# Patient Record
Sex: FEMALE | Race: WHITE | Employment: UNEMPLOYED | URBAN - METROPOLITAN AREA
[De-identification: names, ages, dates, MRNs, and addresses within clinical notes are randomized per-mention and may not be internally consistent; named-entity substitution may affect disease eponyms.]

---

## 2018-11-27 ENCOUNTER — ANESTHESIA (OUTPATIENT)
Dept: ENDOSCOPY | Age: 28
End: 2018-11-27
Payer: MEDICAID

## 2018-11-27 ENCOUNTER — ANESTHESIA EVENT (OUTPATIENT)
Dept: ENDOSCOPY | Age: 28
End: 2018-11-27
Payer: MEDICAID

## 2018-11-27 ENCOUNTER — HOSPITAL ENCOUNTER (OUTPATIENT)
Age: 28
Setting detail: OUTPATIENT SURGERY
Discharge: HOME | End: 2018-11-27
Attending: SPECIALIST | Admitting: SPECIALIST
Payer: MEDICAID

## 2018-11-27 VITALS
HEART RATE: 84 BPM | OXYGEN SATURATION: 100 % | SYSTOLIC BLOOD PRESSURE: 109 MMHG | TEMPERATURE: 97.6 F | BODY MASS INDEX: 21.76 KG/M2 | WEIGHT: 152 LBS | DIASTOLIC BLOOD PRESSURE: 61 MMHG | HEIGHT: 70 IN | RESPIRATION RATE: 16 BRPM

## 2018-11-27 LAB
H PYLORI FROM TISSUE: NEGATIVE
HCG UR QL: NEGATIVE
KIT LOT NO., HCLOLOT: NORMAL
NEGATIVE CONTROL: NEGATIVE
POSITIVE CONTROL: POSITIVE

## 2018-11-27 PROCEDURE — 77030009426 HC FCPS BIOP ENDOSC BSC -B: Performed by: SPECIALIST

## 2018-11-27 PROCEDURE — 87077 CULTURE AEROBIC IDENTIFY: CPT | Performed by: SPECIALIST

## 2018-11-27 PROCEDURE — 76060000032 HC ANESTHESIA 0.5 TO 1 HR: Performed by: SPECIALIST

## 2018-11-27 PROCEDURE — 74011250636 HC RX REV CODE- 250/636

## 2018-11-27 PROCEDURE — 76040000007: Performed by: SPECIALIST

## 2018-11-27 PROCEDURE — 77030027957 HC TBNG IRR ENDOGTR BUSS -B: Performed by: SPECIALIST

## 2018-11-27 PROCEDURE — 88305 TISSUE EXAM BY PATHOLOGIST: CPT

## 2018-11-27 PROCEDURE — 81025 URINE PREGNANCY TEST: CPT

## 2018-11-27 RX ORDER — NALOXONE HYDROCHLORIDE 0.4 MG/ML
0.4 INJECTION, SOLUTION INTRAMUSCULAR; INTRAVENOUS; SUBCUTANEOUS
Status: DISCONTINUED | OUTPATIENT
Start: 2018-11-27 | End: 2018-11-27 | Stop reason: HOSPADM

## 2018-11-27 RX ORDER — SODIUM CHLORIDE 9 MG/ML
INJECTION, SOLUTION INTRAVENOUS
Status: DISCONTINUED | OUTPATIENT
Start: 2018-11-27 | End: 2018-11-27 | Stop reason: HOSPADM

## 2018-11-27 RX ORDER — SODIUM CHLORIDE 9 MG/ML
50 INJECTION, SOLUTION INTRAVENOUS CONTINUOUS
Status: DISCONTINUED | OUTPATIENT
Start: 2018-11-27 | End: 2018-11-27 | Stop reason: HOSPADM

## 2018-11-27 RX ORDER — CLONAZEPAM 0.5 MG/1
0.5 TABLET ORAL
COMMUNITY

## 2018-11-27 RX ORDER — FENTANYL CITRATE 50 UG/ML
200 INJECTION, SOLUTION INTRAMUSCULAR; INTRAVENOUS
Status: DISCONTINUED | OUTPATIENT
Start: 2018-11-27 | End: 2018-11-27 | Stop reason: HOSPADM

## 2018-11-27 RX ORDER — EPINEPHRINE 0.1 MG/ML
1 INJECTION INTRACARDIAC; INTRAVENOUS
Status: DISCONTINUED | OUTPATIENT
Start: 2018-11-27 | End: 2018-11-27 | Stop reason: HOSPADM

## 2018-11-27 RX ORDER — ALPRAZOLAM 0.5 MG/1
0.5 TABLET ORAL DAILY
COMMUNITY

## 2018-11-27 RX ORDER — DEXTROMETHORPHAN/PSEUDOEPHED 2.5-7.5/.8
1.2 DROPS ORAL
Status: DISCONTINUED | OUTPATIENT
Start: 2018-11-27 | End: 2018-11-27 | Stop reason: HOSPADM

## 2018-11-27 RX ORDER — PROPOFOL 10 MG/ML
INJECTION, EMULSION INTRAVENOUS AS NEEDED
Status: DISCONTINUED | OUTPATIENT
Start: 2018-11-27 | End: 2018-11-27 | Stop reason: HOSPADM

## 2018-11-27 RX ORDER — FLUMAZENIL 0.1 MG/ML
0.2 INJECTION INTRAVENOUS
Status: DISCONTINUED | OUTPATIENT
Start: 2018-11-27 | End: 2018-11-27 | Stop reason: HOSPADM

## 2018-11-27 RX ORDER — DEXTROAMPHETAMINE SACCHARATE, AMPHETAMINE ASPARTATE, DEXTROAMPHETAMINE SULFATE AND AMPHETAMINE SULFATE 2.5; 2.5; 2.5; 2.5 MG/1; MG/1; MG/1; MG/1
10 TABLET ORAL 2 TIMES DAILY
COMMUNITY

## 2018-11-27 RX ORDER — ATROPINE SULFATE 0.1 MG/ML
0.5 INJECTION INTRAVENOUS
Status: DISCONTINUED | OUTPATIENT
Start: 2018-11-27 | End: 2018-11-27 | Stop reason: HOSPADM

## 2018-11-27 RX ORDER — LIDOCAINE HYDROCHLORIDE 20 MG/ML
INJECTION, SOLUTION EPIDURAL; INFILTRATION; INTRACAUDAL; PERINEURAL AS NEEDED
Status: DISCONTINUED | OUTPATIENT
Start: 2018-11-27 | End: 2018-11-27 | Stop reason: HOSPADM

## 2018-11-27 RX ORDER — SODIUM CHLORIDE 0.9 % (FLUSH) 0.9 %
5-10 SYRINGE (ML) INJECTION AS NEEDED
Status: DISCONTINUED | OUTPATIENT
Start: 2018-11-27 | End: 2018-11-27 | Stop reason: HOSPADM

## 2018-11-27 RX ORDER — MIDAZOLAM HYDROCHLORIDE 1 MG/ML
.25-1 INJECTION, SOLUTION INTRAMUSCULAR; INTRAVENOUS
Status: DISCONTINUED | OUTPATIENT
Start: 2018-11-27 | End: 2018-11-27 | Stop reason: HOSPADM

## 2018-11-27 RX ORDER — IBUPROFEN 200 MG
600 CAPSULE ORAL 2 TIMES DAILY
COMMUNITY
End: 2019-12-05

## 2018-11-27 RX ORDER — SODIUM CHLORIDE 0.9 % (FLUSH) 0.9 %
5-10 SYRINGE (ML) INJECTION EVERY 8 HOURS
Status: DISCONTINUED | OUTPATIENT
Start: 2018-11-27 | End: 2018-11-27 | Stop reason: HOSPADM

## 2018-11-27 RX ORDER — ACETAMINOPHEN 500 MG
500 TABLET ORAL 2 TIMES DAILY
COMMUNITY

## 2018-11-27 RX ADMIN — PROPOFOL 40 MG: 10 INJECTION, EMULSION INTRAVENOUS at 10:23

## 2018-11-27 RX ADMIN — SODIUM CHLORIDE: 9 INJECTION, SOLUTION INTRAVENOUS at 10:18

## 2018-11-27 RX ADMIN — PROPOFOL 40 MG: 10 INJECTION, EMULSION INTRAVENOUS at 10:26

## 2018-11-27 RX ADMIN — PROPOFOL 60 MG: 10 INJECTION, EMULSION INTRAVENOUS at 10:30

## 2018-11-27 RX ADMIN — PROPOFOL 60 MG: 10 INJECTION, EMULSION INTRAVENOUS at 10:27

## 2018-11-27 RX ADMIN — LIDOCAINE HYDROCHLORIDE 40 MG: 20 INJECTION, SOLUTION EPIDURAL; INFILTRATION; INTRACAUDAL; PERINEURAL at 10:20

## 2018-11-27 RX ADMIN — PROPOFOL 30 MG: 10 INJECTION, EMULSION INTRAVENOUS at 10:40

## 2018-11-27 RX ADMIN — PROPOFOL 50 MG: 10 INJECTION, EMULSION INTRAVENOUS at 10:35

## 2018-11-27 RX ADMIN — PROPOFOL 90 MG: 10 INJECTION, EMULSION INTRAVENOUS at 10:20

## 2018-11-27 RX ADMIN — PROPOFOL 50 MG: 10 INJECTION, EMULSION INTRAVENOUS at 10:22

## 2018-11-27 RX ADMIN — PROPOFOL 60 MG: 10 INJECTION, EMULSION INTRAVENOUS at 10:21

## 2018-11-27 RX ADMIN — PROPOFOL 40 MG: 10 INJECTION, EMULSION INTRAVENOUS at 10:41

## 2018-11-27 RX ADMIN — PROPOFOL 30 MG: 10 INJECTION, EMULSION INTRAVENOUS at 10:38

## 2018-11-27 NOTE — PROGRESS NOTES
Cheyanne Stubbssantos 1990 
[id number] Situation: 
Verbal report received from: [facility] Territory Procedure: Procedure(s): 
COLONOSCOPY 
ESOPHAGOGASTRODUODENOSCOPY (EGD) ESOPHAGOGASTRODUODENAL (EGD) BIOPSY Background: 
 
Preoperative diagnosis: Abdominal pain Postoperative diagnosis: 1. Mild Gastritis 2. Ileitis :  Dr. Lucila Mckeon Assistant(s): Endoscopy RN-1: Alexandra Munson RN Endoscopy RN-2: Mirta Gregg RN Specimens:  
ID Type Source Tests Collected by Time Destination 1 : Terminal Ileum Biopsy for Ileitis Preservative   Rosa Suaoz MD 11/27/2018 1036 Pathology H. Pylori  yes Assessment: 
Intra-procedure medications Anesthesia gave intra-procedure sedation and medications, see anesthesia flow sheet yes Intravenous fluids: NS@ Garett Neighbours Vital signs stable  yes Abdominal assessment: round and soft  yes Recommendation: 
Discharge patient per MD order yes. Family or Friend  yes Permission to share finding with family or friend yes

## 2018-11-27 NOTE — PROGRESS NOTES

## 2018-11-27 NOTE — DISCHARGE INSTRUCTIONS
Kristi August  [id number]  1990    COLON DISCHARGE INSTRUCTIONS  Discomfort:  Redness at IV site- apply warm compress to area; if redness or soreness persist- contact your physician  There may be a slight amount of blood passed from the rectum  Gaseous discomfort- walking, belching will help relieve any discomfort  You may not operate a vehicle for 12 hours  You may not engage in an occupation involving machinery or appliances for rest of today  You may not drink alcoholic beverages for at least 12 hours  Avoid making any critical decisions for at least 24 hour  DIET:   {Gi dietary recommendations:62798}   - however -  remember your colon is empty and a heavy meal will produce gas. Avoid these foods:  vegetables, fried / greasy foods, carbonated drinks for today. MEDICATIONS:      Regarding Aspirin or Nonsteroidal medications, please see below. ACTIVITY:  You may resume your normal daily activities it is recommended that you spend the remainder of the day resting -  avoid any strenuous activity. CALL M.D. ANY SIGN OF:  Increasing pain, nausea, vomiting  Abdominal distension (swelling)  New increased bleeding (oral or rectal)  Fever (chills)  Pain in chest area  Bloody discharge from nose or mouth  Shortness of breath    You {Actions; may/not:34710}  take any Advil, Aspirin, Ibuprofen, Motrin, Aleve, or Goodys for 10 days, ONLY  Tylenol as needed for pain. Follow-up Instructions:   Call Dr. Delicia Osorio  Results of procedure / biopsy in 10 days  Telephone #  [phone]      DISCHARGE SUMMARY from Nurse    The following personal items collected during your admission are returned to you:   Dental Appliance: Dental Appliances: None  Vision: Visual Aid: Glasses, Contacts(glasses only today)  Hearing Aid:    Jewelry:    Clothing:    Other Valuables:    Valuables sent to safe: MyChart Activation    Thank you for requesting access to [facility].  Please follow the instructions below to securely access and download your online medical record. [employer] allows you to send messages to your doctor, view your test results, renew your prescriptions, schedule appointments, and more. How Do I Sign Up? 1. In your internet browser, go to [email or web address]  2. Click on the First Time User? Click Here link in the Sign In box. You will be redirect to the New Member Sign Up page. 3. Enter your [employer] Access Code exactly as it appears below. You will not need to use this code after youve completed the sign-up process. If you do not sign up before the expiration date, you must request a new code. [employer] Access Code: -[email or web address]  Expires: 2019  9:11 AM (This is the date your [employer] access code will )    4. Enter the last four digits of your Social Security Number (xxxx) and Date of Birth (mm/dd/yyyy) as indicated and click Submit. You will be taken to the next sign-up page. 5. Create a [employer] ID. This will be your [employer] login ID and cannot be changed, so think of one that is secure and easy to remember. 6. Create a [employer] password. You can change your password at any time. 7. Enter your Password Reset Question and Answer. This can be used at a later time if you forget your password. 8. Enter your e-mail address. You will receive e-mail notification when new information is available in [address] 9. Click Sign Up. You can now view and download portions of your medical record. 10. Click the Download Summary menu link to download a portable copy of your medical information. Additional Information    If you have questions, please visit the Frequently Asked Questions section of the [employer] website at [email or web address] Remember, [employer] is NOT to be used for urgent needs. For medical emergencies, dial 911.

## 2018-11-27 NOTE — PROCEDURES
[address]  Navdeep Garza, [address]                 NAME:  Clari Mcneil   :   1990   MRN:   [id number]     Date/Time:  2018 10:47 AM    Esophagogastroduodenoscopy (EGD) Procedure Note    :  Shannan Sue MD    Referring Provider:  Corinne Troy, Not On File    Anethesia/Sedation:  MAC anesthesia Propofol    Preoperative diagnosis: Abdominal pain    Postoperative diagnosis: 1. Gastritis  2. Ileitis    Procedure Details     After infom consent was obtained for the procedure, with all risks and benefits of procedure explained the patient was taken to the endoscopy suite and placed in the left lateral decubitus position. Following sequential administration of sedation as per above, the [id number] gastroscope was inserted into the mouth and advanced under direct vision to second portion of the duodenum. A careful inspection was made as the gastroscope was withdrawn, including a retroflexed view of the proximal stomach; findings and interventions are described below. Findings:  Esophagus:normal  Stomach:Patchy erythema in antrum, JUAN DAVID done. Duodenum/jejunum:normal      Therapies:  none    Specimens: JUAN DAVID test           EBL: None    Complications:   None; patient tolerated the procedure well. Impression:    See Postoperative diagnosis above    Recommendations:  -Acid suppression with a proton pump inhibitor. , -Await JUAN DAVID test result and treat for Helicobacter pylori if positive. , -No NSAIDS    Discharge disposition:  Home in the company of  when able to ambulate    Shannan Sue MD

## 2018-11-27 NOTE — ANESTHESIA POSTPROCEDURE EVALUATION
Procedure(s): 
COLONOSCOPY 
ESOPHAGOGASTRODUODENOSCOPY (EGD) ESOPHAGOGASTRODUODENAL (EGD) BIOPSY COLON BIOPSY. <BSHSIANPOST> Visit Vitals /61 Pulse 84 Temp 36.4 °C (97.6 °F) Resp 16 Ht 5' 10\" (1.778 m) Wt 68.9 kg (152 lb) SpO2 100% BMI 21.81 kg/m²

## 2018-11-27 NOTE — PROCEDURES
[address]  [address]                 Colonoscopy Procedure Note    Indications:   See Preoperative Diagnosis above  Referring Physician: Kelvin Skinner, Not On File  Anesthesia/Sedation: MAC anesthesia Propofol  Endoscopist:  Dr. Franck Grey  Assistant:  Endoscopy RN-1: Eugenie Allison RN  Endoscopy RN-2: Guera Gordillo RN  Preoperative diagnosis: Abdominal pain  Postoperative diagnosis: 1. Mild Gastritis  2. Ileitis    Procedure in Detail:  Informed consent was obtained for the procedure, including sedation. Risks of perforation, hemorrhage, adverse drug reaction, and aspiration were discussed. The patient was placed in the left lateral decubitus position. Based on the pre-procedure assessment, including review of the patient's medical history, medications, allergies, and review of systems, she had been deemed to be an appropriate candidate for moderate sedation; she was therefore sedated with the medications listed above. The patient was monitored continuously with ECG tracing, pulse oximetry, blood pressure monitoring, and direct observations. A rectal examination was performed. The [id number] was inserted into the rectum and advanced under direct vision to the terminal ileum. The quality of the colonic preparation was adequate. A careful inspection was made as the colonoscope was withdrawn, including a retroflexed view of the rectum; findings and interventions are described below. Appropriate photodocumentation was obtained. Findings:  Rectum: normal  Sigmoid: normal  Descending Colon: normal  Transverse Colon: normal  Ascending Colon: normal  Cecum: normal  Terminal Ileum: Patchy erythema and congestion noted, biopsies done. Specimens:    ileal biopsy    EBL: None    Complications: None; patient tolerated the procedure well. Recommendations:     - Await pathology. - Colon at Age 49. Office appointment in 3 months.         Signed By: Jo Rojo MD                        November 27, 2018

## 2019-05-21 LAB
ANTIBODY SCREEN, EXTERNAL: NORMAL
CHLAMYDIA, EXTERNAL: NORMAL
HIV, EXTERNAL: NORMAL
N. GONORRHEA, EXTERNAL: NORMAL
RPR, EXTERNAL: NON REACTIVE
RUBELLA, EXTERNAL: NORMAL
TYPE, ABO & RH, EXTERNAL: NORMAL

## 2019-05-28 LAB — HBSAG, EXTERNAL: NEGATIVE

## 2019-11-19 LAB — GRBS, EXTERNAL: NORMAL

## 2019-12-03 ENCOUNTER — HOSPITAL ENCOUNTER (INPATIENT)
Age: 29
LOS: 2 days | Discharge: HOME | DRG: 560 | End: 2019-12-05
Attending: SPECIALIST | Admitting: SPECIALIST
Payer: MEDICAID

## 2019-12-03 ENCOUNTER — ANESTHESIA (OUTPATIENT)
Dept: LABOR AND DELIVERY | Age: 29
DRG: 560 | End: 2019-12-03
Payer: MEDICAID

## 2019-12-03 ENCOUNTER — ANESTHESIA EVENT (OUTPATIENT)
Dept: LABOR AND DELIVERY | Age: 29
DRG: 560 | End: 2019-12-03
Payer: MEDICAID

## 2019-12-03 PROBLEM — Z34.90 PREGNANCY: Status: ACTIVE | Noted: 2019-12-03

## 2019-12-03 LAB
BASOPHILS # BLD: 0 K/UL (ref 0–0.1)
BASOPHILS NFR BLD: 0 % (ref 0–1)
DIFFERENTIAL METHOD BLD: ABNORMAL
EOSINOPHIL # BLD: 0.1 K/UL (ref 0–0.4)
EOSINOPHIL NFR BLD: 1 % (ref 0–7)
ERYTHROCYTE [DISTWIDTH] IN BLOOD BY AUTOMATED COUNT: 13.2 % (ref 11.5–14.5)
HCT VFR BLD AUTO: 36.2 % (ref 35–47)
HGB BLD-MCNC: 11.7 G/DL (ref 11.5–16)
IMM GRANULOCYTES # BLD AUTO: 0.1 K/UL (ref 0–0.04)
IMM GRANULOCYTES NFR BLD AUTO: 1 % (ref 0–0.5)
LYMPHOCYTES # BLD: 1.9 K/UL (ref 0.8–3.5)
LYMPHOCYTES NFR BLD: 18 % (ref 12–49)
MCH RBC QN AUTO: 27.7 PG (ref 26–34)
MCHC RBC AUTO-ENTMCNC: 32.3 G/DL (ref 30–36.5)
MCV RBC AUTO: 85.8 FL (ref 80–99)
MONOCYTES # BLD: 0.9 K/UL (ref 0–1)
MONOCYTES NFR BLD: 9 % (ref 5–13)
NEUTS SEG # BLD: 7.5 K/UL (ref 1.8–8)
NEUTS SEG NFR BLD: 71 % (ref 32–75)
NRBC # BLD: 0 K/UL (ref 0–0.01)
NRBC BLD-RTO: 0 PER 100 WBC
PLATELET # BLD AUTO: 257 K/UL (ref 150–400)
PMV BLD AUTO: 11.7 FL (ref 8.9–12.9)
RBC # BLD AUTO: 4.22 M/UL (ref 3.8–5.2)
WBC # BLD AUTO: 10.4 K/UL (ref 3.6–11)

## 2019-12-03 PROCEDURE — 36415 COLL VENOUS BLD VENIPUNCTURE: CPT

## 2019-12-03 PROCEDURE — 77030014125 HC TY EPDRL BBMI -B: Performed by: ANESTHESIOLOGY

## 2019-12-03 PROCEDURE — 85025 COMPLETE CBC W/AUTO DIFF WBC: CPT

## 2019-12-03 PROCEDURE — 74011250637 HC RX REV CODE- 250/637: Performed by: SPECIALIST

## 2019-12-03 PROCEDURE — 3E0R3BZ INTRODUCTION OF ANESTHETIC AGENT INTO SPINAL CANAL, PERCUTANEOUS APPROACH: ICD-10-PCS | Performed by: ANESTHESIOLOGY

## 2019-12-03 PROCEDURE — 77030005513 HC CATH URETH FOL11 MDII -B

## 2019-12-03 PROCEDURE — 00HU33Z INSERTION OF INFUSION DEVICE INTO SPINAL CANAL, PERCUTANEOUS APPROACH: ICD-10-PCS | Performed by: ANESTHESIOLOGY

## 2019-12-03 PROCEDURE — 74011000250 HC RX REV CODE- 250: Performed by: ANESTHESIOLOGY

## 2019-12-03 PROCEDURE — 75410000000 HC DELIVERY VAGINAL/SINGLE

## 2019-12-03 PROCEDURE — 76060000078 HC EPIDURAL ANESTHESIA

## 2019-12-03 PROCEDURE — 75410000003 HC RECOV DEL/VAG/CSECN EA 0.5 HR

## 2019-12-03 PROCEDURE — 75410000002 HC LABOR FEE PER 1 HR

## 2019-12-03 PROCEDURE — A4300 CATH IMPL VASC ACCESS PORTAL: HCPCS

## 2019-12-03 PROCEDURE — 77030021125

## 2019-12-03 PROCEDURE — 74011250636 HC RX REV CODE- 250/636: Performed by: SPECIALIST

## 2019-12-03 PROCEDURE — 65410000002 HC RM PRIVATE OB

## 2019-12-03 RX ORDER — CALCIUM CARBONATE 200(500)MG
400 TABLET,CHEWABLE ORAL
Status: DISCONTINUED | OUTPATIENT
Start: 2019-12-03 | End: 2019-12-05 | Stop reason: HOSPADM

## 2019-12-03 RX ORDER — FENTANYL/BUPIVACAINE/NS/PF 2-1250MCG
PREFILLED PUMP RESERVOIR EPIDURAL
Status: DISCONTINUED
Start: 2019-12-03 | End: 2019-12-03 | Stop reason: HOSPADM

## 2019-12-03 RX ORDER — SODIUM CHLORIDE 0.9 % (FLUSH) 0.9 %
5-40 SYRINGE (ML) INJECTION EVERY 8 HOURS
Status: DISCONTINUED | OUTPATIENT
Start: 2019-12-03 | End: 2019-12-05 | Stop reason: HOSPADM

## 2019-12-03 RX ORDER — NALOXONE HYDROCHLORIDE 0.4 MG/ML
0.4 INJECTION, SOLUTION INTRAMUSCULAR; INTRAVENOUS; SUBCUTANEOUS AS NEEDED
Status: DISCONTINUED | OUTPATIENT
Start: 2019-12-03 | End: 2019-12-03 | Stop reason: HOSPADM

## 2019-12-03 RX ORDER — SODIUM CHLORIDE 0.9 % (FLUSH) 0.9 %
5-40 SYRINGE (ML) INJECTION EVERY 8 HOURS
Status: DISCONTINUED | OUTPATIENT
Start: 2019-12-03 | End: 2019-12-03 | Stop reason: HOSPADM

## 2019-12-03 RX ORDER — OXYTOCIN/0.9 % SODIUM CHLORIDE 20/1000 ML
1 PLASTIC BAG, INJECTION (ML) INTRAVENOUS
Status: DISCONTINUED | OUTPATIENT
Start: 2019-12-03 | End: 2019-12-04 | Stop reason: HOSPADM

## 2019-12-03 RX ORDER — OXYTOCIN IN 5 % DEXTROSE 30/500 ML
1 PLASTIC BAG, INJECTION (ML) INTRAVENOUS
Status: DISCONTINUED | OUTPATIENT
Start: 2019-12-03 | End: 2019-12-03

## 2019-12-03 RX ORDER — NALOXONE HYDROCHLORIDE 0.4 MG/ML
0.4 INJECTION, SOLUTION INTRAMUSCULAR; INTRAVENOUS; SUBCUTANEOUS AS NEEDED
Status: DISCONTINUED | OUTPATIENT
Start: 2019-12-03 | End: 2019-12-05 | Stop reason: HOSPADM

## 2019-12-03 RX ORDER — BUPIVACAINE HYDROCHLORIDE AND EPINEPHRINE 2.5; 5 MG/ML; UG/ML
INJECTION, SOLUTION EPIDURAL; INFILTRATION; INTRACAUDAL; PERINEURAL
Status: DISCONTINUED
Start: 2019-12-03 | End: 2019-12-03 | Stop reason: HOSPADM

## 2019-12-03 RX ORDER — FENTANYL/BUPIVACAINE/NS/PF 2-1250MCG
1-16 PREFILLED PUMP RESERVOIR EPIDURAL CONTINUOUS
Status: DISCONTINUED | OUTPATIENT
Start: 2019-12-03 | End: 2019-12-03 | Stop reason: HOSPADM

## 2019-12-03 RX ORDER — OXYCODONE AND ACETAMINOPHEN 5; 325 MG/1; MG/1
1 TABLET ORAL
Status: DISCONTINUED | OUTPATIENT
Start: 2019-12-03 | End: 2019-12-05 | Stop reason: HOSPADM

## 2019-12-03 RX ORDER — ONDANSETRON 2 MG/ML
4 INJECTION INTRAMUSCULAR; INTRAVENOUS
Status: DISCONTINUED | OUTPATIENT
Start: 2019-12-03 | End: 2019-12-03 | Stop reason: HOSPADM

## 2019-12-03 RX ORDER — BUPIVACAINE HYDROCHLORIDE AND EPINEPHRINE 2.5; 5 MG/ML; UG/ML
INJECTION, SOLUTION EPIDURAL; INFILTRATION; INTRACAUDAL; PERINEURAL AS NEEDED
Status: DISCONTINUED | OUTPATIENT
Start: 2019-12-03 | End: 2019-12-03 | Stop reason: HOSPADM

## 2019-12-03 RX ORDER — ZOLPIDEM TARTRATE 5 MG/1
5 TABLET ORAL
Status: DISCONTINUED | OUTPATIENT
Start: 2019-12-03 | End: 2019-12-05 | Stop reason: HOSPADM

## 2019-12-03 RX ORDER — FENTANYL CITRATE 50 UG/ML
100 INJECTION, SOLUTION INTRAMUSCULAR; INTRAVENOUS ONCE
Status: ACTIVE | OUTPATIENT
Start: 2019-12-03 | End: 2019-12-04

## 2019-12-03 RX ORDER — NALBUPHINE HYDROCHLORIDE 10 MG/ML
10 INJECTION, SOLUTION INTRAMUSCULAR; INTRAVENOUS; SUBCUTANEOUS
Status: DISCONTINUED | OUTPATIENT
Start: 2019-12-03 | End: 2019-12-03 | Stop reason: HOSPADM

## 2019-12-03 RX ORDER — SODIUM CHLORIDE 0.9 % (FLUSH) 0.9 %
5-40 SYRINGE (ML) INJECTION AS NEEDED
Status: DISCONTINUED | OUTPATIENT
Start: 2019-12-03 | End: 2019-12-03 | Stop reason: HOSPADM

## 2019-12-03 RX ORDER — SODIUM CHLORIDE, SODIUM LACTATE, POTASSIUM CHLORIDE, CALCIUM CHLORIDE 600; 310; 30; 20 MG/100ML; MG/100ML; MG/100ML; MG/100ML
125 INJECTION, SOLUTION INTRAVENOUS CONTINUOUS
Status: DISCONTINUED | OUTPATIENT
Start: 2019-12-03 | End: 2019-12-03 | Stop reason: HOSPADM

## 2019-12-03 RX ORDER — IBUPROFEN 400 MG/1
800 TABLET ORAL EVERY 8 HOURS
Status: DISCONTINUED | OUTPATIENT
Start: 2019-12-03 | End: 2019-12-05 | Stop reason: HOSPADM

## 2019-12-03 RX ORDER — EPHEDRINE SULFATE/0.9% NACL/PF 50 MG/5 ML
10 SYRINGE (ML) INTRAVENOUS
Status: DISCONTINUED | OUTPATIENT
Start: 2019-12-03 | End: 2019-12-03 | Stop reason: HOSPADM

## 2019-12-03 RX ORDER — SODIUM CHLORIDE 0.9 % (FLUSH) 0.9 %
5-40 SYRINGE (ML) INJECTION AS NEEDED
Status: DISCONTINUED | OUTPATIENT
Start: 2019-12-03 | End: 2019-12-05 | Stop reason: HOSPADM

## 2019-12-03 RX ORDER — HYDROCORTISONE ACETATE PRAMOXINE HCL 2.5; 1 G/100G; G/100G
CREAM TOPICAL AS NEEDED
Status: DISCONTINUED | OUTPATIENT
Start: 2019-12-03 | End: 2019-12-05 | Stop reason: HOSPADM

## 2019-12-03 RX ORDER — BUPIVACAINE HYDROCHLORIDE AND EPINEPHRINE 2.5; 5 MG/ML; UG/ML
INJECTION, SOLUTION EPIDURAL; INFILTRATION; INTRACAUDAL; PERINEURAL
Status: COMPLETED
Start: 2019-12-03 | End: 2019-12-03

## 2019-12-03 RX ORDER — CALCIUM CARBONATE 200(500)MG
2 TABLET,CHEWABLE ORAL 3 TIMES DAILY
COMMUNITY

## 2019-12-03 RX ADMIN — IBUPROFEN 800 MG: 400 TABLET, FILM COATED ORAL at 22:00

## 2019-12-03 RX ADMIN — CALCIUM CARBONATE (ANTACID) CHEW TAB 500 MG 400 MG: 500 CHEW TAB at 19:27

## 2019-12-03 RX ADMIN — Medication 333 MILLI-UNITS/MIN: at 20:38

## 2019-12-03 RX ADMIN — SODIUM CHLORIDE, SODIUM LACTATE, POTASSIUM CHLORIDE, AND CALCIUM CHLORIDE 125 ML/HR: 600; 310; 30; 20 INJECTION, SOLUTION INTRAVENOUS at 11:02

## 2019-12-03 RX ADMIN — BUPIVACAINE HYDROCHLORIDE AND EPINEPHRINE 5 ML: 2.5; 5 INJECTION, SOLUTION EPIDURAL; INFILTRATION; INTRACAUDAL; PERINEURAL at 14:11

## 2019-12-03 RX ADMIN — SODIUM CHLORIDE, SODIUM LACTATE, POTASSIUM CHLORIDE, AND CALCIUM CHLORIDE 999 ML/HR: 600; 310; 30; 20 INJECTION, SOLUTION INTRAVENOUS at 13:45

## 2019-12-03 RX ADMIN — SODIUM CHLORIDE, SODIUM LACTATE, POTASSIUM CHLORIDE, AND CALCIUM CHLORIDE 125 ML/HR: 600; 310; 30; 20 INJECTION, SOLUTION INTRAVENOUS at 14:19

## 2019-12-03 RX ADMIN — BUPIVACAINE HYDROCHLORIDE AND EPINEPHRINE 5 ML: 2.5; 5 INJECTION, SOLUTION EPIDURAL; INFILTRATION; INTRACAUDAL; PERINEURAL at 14:06

## 2019-12-03 RX ADMIN — BUPIVACAINE HYDROCHLORIDE AND EPINEPHRINE 0.5 ML: 2.5; 5 INJECTION, SOLUTION EPIDURAL; INFILTRATION; INTRACAUDAL; PERINEURAL at 14:05

## 2019-12-03 RX ADMIN — Medication 12 ML/HR: at 14:45

## 2019-12-03 NOTE — ROUTINE PROCESS
1040 pt from office for labor started uc about 0900, no bleeding pos fm, consents signed, pt intobed efm x 2

## 2019-12-03 NOTE — H&P
History & Physical    Name: Shae Mayer MRN: [id number]  SSN: [id number]    YOB: 1990  Age: 30 y.o. Sex: female        Subjective:     Estimated Date of Delivery: None noted. OB History    Para Term  AB Living   1             SAB TAB Ectopic Molar Multiple Live Births                    # Outcome Date GA Lbr [place]/2nd Weight Sex Delivery Anes PTL Lv   1 Current                Ms. Red Bianchi is admitted with pregnancy at Unknown for active labor. Prenatal course was normal. Please see prenatal records for details. Past Medical History:   Diagnosis Date    Anxiety      Past Surgical History:   Procedure Laterality Date    COLONOSCOPY N/A 2018    COLONOSCOPY performed by Ivonne Garcia MD at [facility] Not on file   Tobacco Use    Smoking status: Current Some Day Smoker    Smokeless tobacco: Never Used    Tobacco comment: Socially smokes   Substance and Sexual Activity    Alcohol use: Yes     Comment: very rare    Drug use: No    Sexual activity: Not on file     No family history on file. No Known Allergies  Prior to Admission medications    Medication Sig Start Date End Date Taking? Authorizing Provider   ibuprofen 200 mg cap Take 600 mg by mouth two (2) times a day. Provider, Historical   acetaminophen (TYLENOL EXTRA STRENGTH) 500 mg tablet Take 500 mg by mouth two (2) times a day. Provider, Historical   dextroamphetamine-amphetamine (ADDERALL) 10 mg tablet Take 10 mg by mouth two (2) times a day. Provider, Historical   clonazePAM (KLONOPIN) 0.5 mg tablet Take 0.5 mg by mouth nightly as needed. Provider, Historical   ALPRAZolam (XANAX) 0.5 mg tablet Take 0.5 mg by mouth daily. Provider, Historical   dextroamphetamine-amphetamine (ADDERALL) 10 mg tablet Take 10 mg by mouth two (2) times a day. Provider, Historical   ALPRAZolam (XANAX) 0.5 mg tablet Take 0.5 mg by mouth daily as needed for Anxiety. Provider, Historical   clonazePAM (KLONOPIN) 0.5 mg tablet Take 0.5 mg by mouth two (2) times daily as needed. Provider, Historical   acetaminophen (TYLENOL EXTRA STRENGTH) 500 mg tablet Take 1,000 mg by mouth two (2) times daily as needed for Pain. Provider, Historical   ibuprofen (MOTRIN) 200 mg tablet Take 600 mg by mouth two (2) times daily as needed for Pain. Provider, Historical        Review of Systems: A comprehensive review of systems was negative except for that written in the HPI. Objective:     Vitals: There were no vitals filed for this visit. Physical Exam:  5cm  Membranes:  Intact  Fetal Heart Rate: Reactive    Prenatal Labs:   No results found for: ABORH, RUBELLAEXT, GRBSEXT, HBSAGEXT, HIVEXT, RPREXT, GONNOEXT, CHLAMEXT, ABORHEXT, RUBELLAEXT, GRBSEXT, HBSAGEXT, HIVEXT, RPREXT, GONNOEXT, CHLAMEXT      Assessment/Plan:     Active Problems:    Pregnancy (12/3/2019)         Plan: Admit for Continue plan for vaginal delivery.   Group B Strep was see prenatal.

## 2019-12-03 NOTE — PROGRESS NOTES
1410 Bedside SBAR report received from JOVANNY Perkins RN  on  Milad Simon, in room  4666  ,care assumed. Pt sitting up getting an epidural by Dr Abena Wilson. 1413 Lying down S/P Epidural    1430  [place] Catheter inserted, clear yellow urine return.,  Pt turned to her left side. @ [address] Dr Dez Black and Charge Nurse H. Raelene Meckel in with my patient SVE and AROM by Dr Dez Black, 7 cm and clear fluid. 1830 feeling increased pain/pressure at pubic bone. . Left side lying release. 1843 Right side-lying release    1855 left side lying release    1910 Left side, trendelenburg position    1925 SVE by JAIRON Taylor RN 9 cm, Chux changed. 1930 Verbal bedside report given to JAIRON Taylor, [address] [place] [address], room 9502. Report consisted of patients Situation, Background, Assessment and Recommendations(SBAR). Information from the following report(s) SBAR, Kardex, Intake/Output, MAR and Recent Results were reviewed with the receiving nurse. Opportunity for questions and clarification was provided,care assumed.

## 2019-12-03 NOTE — ANESTHESIA PROCEDURE NOTES
CSE Block    Performed by: Dell Del Rio MD  Authorized by: Dell Del Rio MD     Pre-Procedure      OB Combined Spinal-Epidural Note    Risk and benefits were discussed with the patient and plans are to proceed. Patient was placed in the seated position. The back was prepped at the lumbar region with Duraprep. 3 ml 0.25% bupivacaine with 1:200K epinephrine  was used as local at L3 - L4. A 17 Tuohy needle was passed x 1 attempt(s) at the above stated level. Loss of resistance at 5 cm. A 25 g pencil point spinal needle was placed through the Touhy until CSF was obtained. 0.5 mL 0.25% bupivacaine with 1:200K epinephrine was deposited into the CSF. A 19 g spring type epidural catheter was placed through the Touhy and secured at 11 cm at the skin. Test dose with 5 mL 0.25% bupivacaine with 1:200 epinephrine was negative. No paresthesia.     Francisca Solorio MD  12/3/2019

## 2019-12-04 PROCEDURE — 74011250637 HC RX REV CODE- 250/637: Performed by: SPECIALIST

## 2019-12-04 PROCEDURE — 65410000002 HC RM PRIVATE OB

## 2019-12-04 RX ADMIN — CALCIUM CARBONATE (ANTACID) CHEW TAB 500 MG 400 MG: 500 CHEW TAB at 08:19

## 2019-12-04 RX ADMIN — CALCIUM CARBONATE (ANTACID) CHEW TAB 500 MG 400 MG: 500 CHEW TAB at 16:24

## 2019-12-04 RX ADMIN — IBUPROFEN 800 MG: 400 TABLET, FILM COATED ORAL at 05:12

## 2019-12-04 RX ADMIN — OXYCODONE AND ACETAMINOPHEN 1 TABLET: 5; 325 TABLET ORAL at 12:20

## 2019-12-04 RX ADMIN — OXYCODONE AND ACETAMINOPHEN 1 TABLET: 5; 325 TABLET ORAL at 08:19

## 2019-12-04 RX ADMIN — OXYCODONE AND ACETAMINOPHEN 1 TABLET: 5; 325 TABLET ORAL at 16:24

## 2019-12-04 RX ADMIN — IBUPROFEN 800 MG: 400 TABLET, FILM COATED ORAL at 12:21

## 2019-12-04 RX ADMIN — CALCIUM CARBONATE (ANTACID) CHEW TAB 500 MG 400 MG: 500 CHEW TAB at 12:20

## 2019-12-04 RX ADMIN — IBUPROFEN 800 MG: 400 TABLET, FILM COATED ORAL at 20:55

## 2019-12-04 RX ADMIN — OXYCODONE AND ACETAMINOPHEN 1 TABLET: 5; 325 TABLET ORAL at 20:55

## 2019-12-04 NOTE — ROUTINE PROCESS
Bedside shift change report given to AL Lucas (oncoming nurse) by JAIRON Nova RN (offgoing nurse). Report included the following information SBAR, Procedure Summary, Intake/Output, MAR and Recent Results.

## 2019-12-04 NOTE — PROGRESS NOTES
Post-Partum Day Number 1 Progress Note    Jose Radha     Information for the patient's :  Josette Leyden [[id number]]   Vaginal, Spontaneous   Patient doing well without significant complaint. Voiding without difficulty, normal lochia. Vitals:    Visit Vitals  /59 (BP 1 Location: Right arm, BP Patient Position: At rest)   Pulse 71   Temp 97.8 °F (36.6 °C)   Resp 18   Ht 5' 9\" (1.753 m)   Wt 86.2 kg (190 lb)   SpO2 97%   Breastfeeding Yes   BMI 28.06 kg/m²     Temp (24hrs), Av.4 °F (36.9 °C), Min:97.8 °F (36.6 °C), Max:98.8 °F (37.1 °C)          Exam:  Patient without distress. Abdomen soft, fundus firm, nontender                Perineum with normal lochia noted. Lower extremities are negative for swelling, cords or tenderness. Labs:     Lab Results   Component Value Date/Time    WBC 10.4 2019 11:00 AM    WBC 11.0 2009 12:15 PM    HGB 11.7 2019 11:00 AM    HGB 12.7 2009 12:15 PM    HCT 36.2 2019 11:00 AM    HCT 37.6 2009 12:15 PM    PLATELET 685  11:00 AM    PLATELET 109  12:15 PM       Recent Results (from the past 24 hour(s))   CBC WITH AUTOMATED DIFF    Collection Time: 19 11:00 AM   Result Value Ref Range    WBC 10.4 3.6 - 11.0 K/uL    RBC 4.22 3.80 - 5.20 M/uL    HGB 11.7 11.5 - 16.0 g/dL    HCT 36.2 35.0 - 47.0 %    MCV 85.8 80.0 - 99.0 FL    MCH 27.7 26.0 - 34.0 PG    MCHC 32.3 30.0 - 36.5 g/dL    RDW 13.2 11.5 - 14.5 %    PLATELET 441 997 - 530 K/uL    MPV 11.7 8.9 - 12.9 FL    NRBC 0.0 0  WBC    ABSOLUTE NRBC 0.00 0.00 - 0.01 K/uL    NEUTROPHILS 71 32 - 75 %    LYMPHOCYTES 18 12 - 49 %    MONOCYTES 9 5 - 13 %    EOSINOPHILS 1 0 - 7 %    BASOPHILS 0 0 - 1 %    IMMATURE GRANULOCYTES 1 (H) 0.0 - 0.5 %    ABS. NEUTROPHILS 7.5 1.8 - 8.0 K/UL    ABS. LYMPHOCYTES 1.9 0.8 - 3.5 K/UL    ABS. MONOCYTES 0.9 0.0 - 1.0 K/UL    ABS. EOSINOPHILS 0.1 0.0 - 0.4 K/UL    ABS.  BASOPHILS 0.0 0.0 - 0.1 K/UL    ABS. IMM. GRANS. 0.1 (H) 0.00 - 0.04 K/UL    DF AUTOMATED         Assessment: Doing well, post partum day 1      Plan:  1.  Continue routine postpartum and perineal care as well as maternal education

## 2019-12-04 NOTE — LACTATION NOTE
This note was copied from a baby's chart. P3  1st time breastfeeding mother. Pt will successfully establish breastfeeding by feeding in response to early feeding cues or wake every 3h, will obtain deep latch, and will keep log of feedings/output. Taught to BF at hunger cues and or q 2-3 hrs and to offer 10-20 drops of hand expressed colostrum at any non-feeds. Has independently hand expressed drops every hour with last 3 attempts     Breast Assessment  Left Breast: Medium  Left Nipple: Everted, Intact  Right Breast: Medium  Right Nipple: Intact, Everted  Breast- Feeding Assessment  Attends Breast-Feeding Classes: No  Breast-Feeding Experience: No  Breast Trauma/Surgery: No  Type/Quality: Good  Lactation Consultant Visits  Breast-Feedings: Good     Reviewed breastfeeding basics:  How milk is made and normal  breastfeeding behaviors discussed. Supply and demand,  stomach size, early feeding cues, skin to skin bonding, positioning and baby led latch-on, assymetrical latch with signs of good, deep latch vs shallow, feeding frequency and duration, and log sheet for tracking infant feedings and output. Breastfeeding Booklet and Warm line information given. Discussed typical  weight loss and the importance of infant weight checks with pediatrician 1 day post discharge. Recommend calling insurance company for breast pump benefit for prn uses. Will continue to follow and observe. Skin to skin and resting. 1100 30 minute feeding completed. Doing well.

## 2019-12-04 NOTE — PROGRESS NOTES
TRANSFER - IN REPORT:    Verbal report received from [address] [place] [address], RN(name) on Nando Salas  being received from L&D(unit) for routine progression of care      Report consisted of patients Situation, Background, Assessment and   Recommendations(SBAR). Information from the following report(s) SBAR, Procedure Summary, Intake/Output, MAR and Recent Results was reviewed with the receiving nurse. Opportunity for questions and clarification was provided. Assessment completed upon patients arrival to unit and care assumed.

## 2019-12-04 NOTE — PROGRESS NOTES
Assumed care. Intro. Done. poc explained. Assess. Done feeling lots of pressure. sve done 9 cm. pericare done. Underpad changed.    1950. Dr. Maurisio Jacome at bedside. View strip poc explained. sve done. 9 c m    2200. Motrin given after eating crackers and dinner served.    2330. Bedside report given to miu nurse and care turned over.

## 2019-12-04 NOTE — PROCEDURES
of a viable male infant over an intact perineum in OA position. +nuchal cord reduced on the perineum. No shoulder dystocia. Placenta del spont, intact with 3VC. NO LACERATIONS. EBL 200ml.

## 2019-12-05 VITALS
WEIGHT: 190 LBS | RESPIRATION RATE: 16 BRPM | TEMPERATURE: 98.1 F | HEART RATE: 86 BPM | OXYGEN SATURATION: 97 % | HEIGHT: 69 IN | SYSTOLIC BLOOD PRESSURE: 116 MMHG | BODY MASS INDEX: 28.14 KG/M2 | DIASTOLIC BLOOD PRESSURE: 78 MMHG

## 2019-12-05 PROCEDURE — 77030021125

## 2019-12-05 PROCEDURE — 90715 TDAP VACCINE 7 YRS/> IM: CPT | Performed by: SPECIALIST

## 2019-12-05 PROCEDURE — 74011250637 HC RX REV CODE- 250/637: Performed by: SPECIALIST

## 2019-12-05 PROCEDURE — 90471 IMMUNIZATION ADMIN: CPT

## 2019-12-05 PROCEDURE — 90686 IIV4 VACC NO PRSV 0.5 ML IM: CPT | Performed by: SPECIALIST

## 2019-12-05 PROCEDURE — 74011250636 HC RX REV CODE- 250/636: Performed by: SPECIALIST

## 2019-12-05 RX ORDER — IBUPROFEN 800 MG/1
800 TABLET ORAL
Qty: 20 TAB | Refills: 0 | Status: SHIPPED | OUTPATIENT
Start: 2019-12-05

## 2019-12-05 RX ADMIN — OXYCODONE AND ACETAMINOPHEN 1 TABLET: 5; 325 TABLET ORAL at 10:35

## 2019-12-05 RX ADMIN — INFLUENZA VIRUS VACCINE 0.5 ML: 15; 15; 15; 15 SUSPENSION INTRAMUSCULAR at 09:30

## 2019-12-05 RX ADMIN — TETANUS TOXOID, REDUCED DIPHTHERIA TOXOID AND ACELLULAR PERTUSSIS VACCINE, ADSORBED 0.5 ML: 5; 2.5; 8; 8; 2.5 SUSPENSION INTRAMUSCULAR at 09:30

## 2019-12-05 RX ADMIN — IBUPROFEN 800 MG: 400 TABLET, FILM COATED ORAL at 04:46

## 2019-12-05 RX ADMIN — CALCIUM CARBONATE (ANTACID) CHEW TAB 500 MG 400 MG: 500 CHEW TAB at 08:25

## 2019-12-05 NOTE — ROUTINE PROCESS
Bedside and Verbal shift change report given to AL Casey (oncoming nurse) by Jason Lopez RN (offgoing nurse). Report included the following information SBAR, Procedure Summary, Intake/Output and MAR.

## 2019-12-05 NOTE — DISCHARGE INSTRUCTIONS
Vaginal Childbirth: What To Expect At Home    Your Recovery: Your body will slowly heal in the next few weeks. It is easy to get too tired and overwhelmed during the first weeks after your baby is born. Changes in your hormones can shift your mood without warning. You may find it hard to meet the extra demands on your energy and time. Take it easy on yourself. Follow-up care is a key part of your treatment and safety. Be sure to make and go to all appointments, and call your doctor if you are having problems. It's also a good idea to know your test results and keep a list of the medicines you take. How can you care for yourself at home? Vaginal bleeding and cramps  · After delivery, you will have a bloody discharge from the vagina. This will turn pink within a week and then white or yellow after about 10 days. It may last for 2 to 4 weeks or longer, until the uterus has healed. Use pads instead of tampons until you stop bleeding. · Do not worry if you pass some blood clots, as long as they are smaller than a golf ball. If you have a tear or stitches in your vaginal area, change the pad at least every 4 hours to prevent soreness and infection. · You may have cramps for the first few days after childbirth. These are normal and occur as the uterus shrinks to normal size. Take an over-the-counter pain medicine, such as acetaminophen (Tylenol), ibuprofen (Advil, Motrin), or naproxen (Aleve), for cramps. Read and follow all instructions on the label. Do not take aspirin, because it can cause more bleeding. Do not take acetaminophen (Tylenol) and other acetaminophen containing medications (i.e. Percocet) at the same time. Breast fullness  · Your breasts may overfill (engorge) in the first few days after delivery. To help milk flow and to relieve pain, warm your breasts in the shower or by using warm, moist towels before nursing.   · If you are not nursing, do not put warmth on your breasts or touch your breasts. Wear a tight bra or sports bra and use ice until the fullness goes away. This usually takes 2 to 3 days. · Put ice or a cold pack on your breast after nursing to reduce swelling and pain. Put a thin cloth between the ice and your skin. Activity  · Eat a balanced diet. Do not try to lose weight by cutting calories. Keep taking your prenatal vitamins, or take a multivitamin. · Get as much rest as you can. Try to take naps when your baby sleeps during the day. · Get some exercise every day. But do not do any heavy exercise until your doctor says it is okay. · Wait until you are healed (about 4 to 6 weeks) before you have sexual intercourse. Your doctor will tell you when it is okay to have sex. · Talk to your doctor about birth control. You can get pregnant even before your period returns. Also, you can get pregnant while you are breast-feeding. Mental Health  · Many women get the \"baby blues\" during the first few days after childbirth. You may lose sleep, feel irritable, and cry easily. You may feel happy one minute and sad the next. Hormone changes are one cause of these emotional changes. Also, the demands of a new baby, along with visits from relatives or other family needs, add to a mother's stress. The \"baby blues\" often peak around the fourth day. Then they ease up in less than 2 weeks. · If your moodiness or anxiety lasts for more than 2 weeks, or if you feel like life is not worth living, you may have postpartum depression. This is different for each mother. Some mothers with serious depression may worry intensely about their infant's well-being. Others may feel distant from their child. Some mothers might even feel that they might harm their baby. A mother may have signs of paranoia, wondering if someone is watching her. · With all the changes in your life, you may not know if you are depressed.  Pregnancy sometimes causes changes in how you feel that are similar to the symptoms of depression. · Symptoms of depression include:  · Feeling sad or hopeless and losing interest in daily activities. These are the most common symptoms of depression. · Sleeping too much or not enough. · Feeling tired. You may feel as if you have no energy. · Eating too much or too little. · [employer] ([employer]I) offers a Warm line; Chat with the Expert phone sessions; Information and Articles about Pregnancy and Postpartum Mood Disorders; Comprehensive List of Free Support Groups; Knowledgeable local coordinators who will offer support, information, and resources; Guide to Resources on [employer]; Calendar of events in the  mood disorders community; Latest News and Research; and [facility] [address] for [employer]. Remember - You are not alone; You are not to blame; With help, you will be well. 4-876-099-PPD(9209). WWW. POSTPARTUM. NET   · Writing or talking about death, such as writing suicide notes or talking about guns, knives, or pills. Keep the numbers for these national suicide hotlines: 5-648-969-TALK (6-791.614.9472) and 2-860-UNYAAYL (5-173.947.3877). If you or someone you know talks about suicide or feeling hopeless, get help right away. Constipation and Hemorrhoids  · Drink plenty of fluids, enough so that your urine is light yellow or clear like water. If you have kidney, heart, or liver disease and have to limit fluids, talk with your doctor before you increase the amount of fluids you drink. · Eat plenty of fiber each day. Have a bran muffin or bran cereal for breakfast, and try eating a piece of fruit for a mid-afternoon snack. · For painful, itchy hemorrhoids, put ice or a cold pack on the area several times a day for 10 minutes at a time. Follow this by putting a warm compress on the area for another 10 to 20 minutes or by sitting in a shallow, warm bath. When should you call for help? Call 911 anytime you think you may need emergency care.  For example, call if:  · You are thinking of hurting yourself, your baby, or anyone else. · You passed out (lost consciousness). · You have symptoms of a blood clot in your lung (called a pulmonary embolism). These may include:    · Sudden chest pain. · Trouble breathing. · Coughing up blood. Call your doctor now or seek immediate medical care if:  · You have severe vaginal bleeding. · You are soaking through a pad each hour for 2 or more hours. · Your vaginal bleeding seems to be getting heavier or is still bright red 4 days after delivery. · You are dizzy or lightheaded, or you feel like you may faint. · You are vomiting or cannot keep fluids down. · You have a fever. · You have new or more belly pain. · You pass tissue (not just blood). · Your vaginal discharge smells bad. · Your belly feels tender or full and hard. · Your breasts are continuously painful or red. · You feel sad, anxious, or hopeless for more than a few days. · You have sudden, severe pain in your belly. · You have symptoms of a blood clot in your leg (called a deep vein thrombosis),          such as:  · Pain in your calf, back of the knee, thigh, or groin. · Redness and swelling in your leg or groin. · You have symptoms of preeclampsia, such as:  · Sudden swelling of your face, hands, or feet. · New vision problems (such as dimness or blurring). · A severe headache. · Your blood pressure is higher than it should be or rises suddenly. · You have new nausea or vomiting. Watch closely for changes in your health, and be sure to contact your doctor if you have any problems.

## 2019-12-05 NOTE — LACTATION NOTE
This note was copied from a baby's chart. PP day #2  9 baby led feedings  4 wet and 5 stools. Am wt assessed at -5.16%      Breast Assessment  Left Breast: Medium  Left Nipple: Everted, Intact  Right Breast: Medium  Right Nipple: Intact, Everted  Breast- Feeding Assessment  Attends Breast-Feeding Classes: No  Breast-Feeding Experience: No  Breast Trauma/Surgery: No  Type/Quality: Good  Lactation Consultant Visits  Breast-Feedings: Good      LATCH Documentation  Latch: Grasps breast, tongue down, lips flanged, rhythmic sucking  Audible Swallowing: A few with stimulation  Type of Nipple: Everted (after stimulation)  Comfort (Breast/Nipple): Soft/non-tender  Hold (Positioning): No assist from staff, mother able to position/hold infant  LATCH Score: 9    Shells for sore nipple management/nipple care reviewed  Expect success. Warm line and support group information provided.  Call prn

## 2019-12-05 NOTE — DISCHARGE SUMMARY
Post-Partum Day Number 2 Progress Note    Allen Stephens     Information for the patient's :  Gianfranco Sutton [[id number]]   Vaginal, Spontaneous   Patient doing well without significant complaint. Voiding without difficulty, normal lochia. Vitals:    Visit Vitals  /69 (BP 1 Location: Right arm, BP Patient Position: Sitting)   Pulse 80   Temp 98 °F (36.7 °C)   Resp 18   Ht 5' 9\" (1.753 m)   Wt 86.2 kg (190 lb)   SpO2 97%   Breastfeeding Yes   BMI 28.06 kg/m²     Temp (24hrs), Av.2 °F (36.8 °C), Min:98 °F (36.7 °C), Max:98.3 °F (36.8 °C)          Exam:         Patient without distress. Abdomen soft, fundus firm, nontender                Lower extremities are negative for swelling, cords or tenderness. Labs:     Lab Results   Component Value Date/Time    WBC 10.4 2019 11:00 AM    WBC 11.0 2009 12:15 PM    HGB 11.7 2019 11:00 AM    HGB 12.7 2009 12:15 PM    HCT 36.2 2019 11:00 AM    HCT 37.6 2009 12:15 PM    PLATELET 352  11:00 AM    PLATELET 462  12:15 PM       No results found for this or any previous visit (from the past 24 hour(s)). Assessment: Doing well, post partum day 2      Plan:   1. Discharge home today  2. Follow up in office in 6 weeks with Diamond Barajas MD  3. Post partum activity advised, diet as tolerated  4.  Discharge Medications: ibuprofen, percocet and medications prior to admission

## 2019-12-05 NOTE — ROUTINE PROCESS
Bedside shift change report given to CAM Miller RN (oncoming nurse) by AL Waddell RN (offgoing nurse). Report included the following information SBAR, Intake/Output, MAR and Recent Results.

## 2019-12-05 NOTE — ROUTINE PROCESS
Patient discharge prescription & instructions given. Verbalized understanding. All questions answered. No distress noted. Signed copy of discharge instructions on paper chart. Pt instructed to follow up with OB in 1 month, sooner if needed.

## 2019-12-05 NOTE — LACTATION NOTE
Couplet Interdisciplinary Rounds     MATERNAL    Daily Goal:     Influenza screening completed: YES and On MAR   Tdap screening completed: YES and On MAR   Rhogam Given:N/A  MMR Given:N/A    VTE Prophylaxis: Not indicated, per Provider order    EPDS:            Patient Name: Leslie Tejeda Diagnosis: Pregnancy [Z34.90]   Date of Admission: 12/3/2019 LOS: 2  Gestational Age: Gestational Age: <None>       Daily Goal:     Birth Weight: No birth weight on file.  Current Weight: Weight: 86.2 kg (190 lb)  % of Weight Change: Birth weight not on file    Feeding:  [place] Metabolic Screen: YES and Initial    Hepatitis B:  YES and On MAR    Discharge Bili:  YES  Car Seat Trial, if needed:  N/A      Patient/Family Teaching Needs:     Days before discharge: Ready for discharge    In Attendance:  Nursing and Physician

## 2019-12-05 NOTE — ROUTINE PROCESS
2300 Bedside shift change report given to LOGAN Deal RN (oncoming nurse) by Alex Gallegos [facility] (offgoing nurse). Report included the following information SBAR, Kardex, Intake/Output and MAR.

## 2020-06-29 ENCOUNTER — HOSPITAL ENCOUNTER (EMERGENCY)
Age: 30
Discharge: HOME | End: 2020-06-30
Attending: PEDIATRICS | Admitting: PEDIATRICS
Payer: MEDICAID

## 2020-06-29 DIAGNOSIS — M51.36 DEGENERATIVE DISC DISEASE, LUMBAR: Primary | ICD-10-CM

## 2020-06-29 DIAGNOSIS — G90.50 COMPLEX REGIONAL PAIN SYNDROME TYPE 1, AFFECTING UNSPECIFIED SITE: ICD-10-CM

## 2020-06-29 DIAGNOSIS — M48.00 CENTRAL STENOSIS OF SPINAL CANAL: ICD-10-CM

## 2020-06-29 LAB
ALBUMIN SERPL-MCNC: 4 G/DL (ref 3.5–5)
ALBUMIN/GLOB SERPL: 1.1 {RATIO} (ref 1.1–2.2)
ALP SERPL-CCNC: 90 U/L (ref 45–117)
ALT SERPL-CCNC: 20 U/L (ref 12–78)
ANION GAP SERPL CALC-SCNC: 7 MMOL/L (ref 5–15)
AST SERPL-CCNC: 13 U/L (ref 15–37)
BASOPHILS # BLD: 0 K/UL (ref 0–0.1)
BASOPHILS NFR BLD: 0 % (ref 0–1)
BILIRUB SERPL-MCNC: 0.9 MG/DL (ref 0.2–1)
BUN SERPL-MCNC: 7 MG/DL (ref 6–20)
BUN/CREAT SERPL: 11 (ref 12–20)
CALCIUM SERPL-MCNC: 9 MG/DL (ref 8.5–10.1)
CHLORIDE SERPL-SCNC: 106 MMOL/L (ref 97–108)
CO2 SERPL-SCNC: 26 MMOL/L (ref 21–32)
COMMENT, HOLDF: NORMAL
CREAT SERPL-MCNC: 0.64 MG/DL (ref 0.55–1.02)
DIFFERENTIAL METHOD BLD: NORMAL
EOSINOPHIL # BLD: 0.2 K/UL (ref 0–0.4)
EOSINOPHIL NFR BLD: 2 % (ref 0–7)
ERYTHROCYTE [DISTWIDTH] IN BLOOD BY AUTOMATED COUNT: 12.9 % (ref 11.5–14.5)
ERYTHROCYTE [SEDIMENTATION RATE] IN BLOOD: 3 MM/HR (ref 0–20)
GLOBULIN SER CALC-MCNC: 3.6 G/DL (ref 2–4)
GLUCOSE SERPL-MCNC: 92 MG/DL (ref 65–100)
HCT VFR BLD AUTO: 42.2 % (ref 35–47)
HGB BLD-MCNC: 13.1 G/DL (ref 11.5–16)
IMM GRANULOCYTES # BLD AUTO: 0 K/UL (ref 0–0.04)
IMM GRANULOCYTES NFR BLD AUTO: 0 % (ref 0–0.5)
LYMPHOCYTES # BLD: 3.2 K/UL (ref 0.8–3.5)
LYMPHOCYTES NFR BLD: 34 % (ref 12–49)
MCH RBC QN AUTO: 27.4 PG (ref 26–34)
MCHC RBC AUTO-ENTMCNC: 31 G/DL (ref 30–36.5)
MCV RBC AUTO: 88.3 FL (ref 80–99)
MONOCYTES # BLD: 0.8 K/UL (ref 0–1)
MONOCYTES NFR BLD: 8 % (ref 5–13)
NEUTS SEG # BLD: 5 K/UL (ref 1.8–8)
NEUTS SEG NFR BLD: 56 % (ref 32–75)
NRBC # BLD: 0 K/UL (ref 0–0.01)
NRBC BLD-RTO: 0 PER 100 WBC
PLATELET # BLD AUTO: 294 K/UL (ref 150–400)
PMV BLD AUTO: 10.1 FL (ref 8.9–12.9)
POTASSIUM SERPL-SCNC: 3.9 MMOL/L (ref 3.5–5.1)
PROT SERPL-MCNC: 7.6 G/DL (ref 6.4–8.2)
RBC # BLD AUTO: 4.78 M/UL (ref 3.8–5.2)
SAMPLES BEING HELD,HOLD: NORMAL
SODIUM SERPL-SCNC: 139 MMOL/L (ref 136–145)
TROPONIN I SERPL-MCNC: <0.05 NG/ML
WBC # BLD AUTO: 9.2 K/UL (ref 3.6–11)

## 2020-06-29 PROCEDURE — 99285 EMERGENCY DEPT VISIT HI MDM: CPT

## 2020-06-29 PROCEDURE — 85652 RBC SED RATE AUTOMATED: CPT

## 2020-06-29 PROCEDURE — 85025 COMPLETE CBC W/AUTO DIFF WBC: CPT

## 2020-06-29 PROCEDURE — 36415 COLL VENOUS BLD VENIPUNCTURE: CPT

## 2020-06-29 PROCEDURE — 96375 TX/PRO/DX INJ NEW DRUG ADDON: CPT

## 2020-06-29 PROCEDURE — 93005 ELECTROCARDIOGRAM TRACING: CPT

## 2020-06-29 PROCEDURE — 80053 COMPREHEN METABOLIC PANEL: CPT

## 2020-06-29 PROCEDURE — 85379 FIBRIN DEGRADATION QUANT: CPT

## 2020-06-29 PROCEDURE — 84484 ASSAY OF TROPONIN QUANT: CPT

## 2020-06-29 PROCEDURE — 86431 RHEUMATOID FACTOR QUANT: CPT

## 2020-06-29 PROCEDURE — 96374 THER/PROPH/DIAG INJ IV PUSH: CPT

## 2020-06-30 ENCOUNTER — APPOINTMENT (OUTPATIENT)
Dept: MRI IMAGING | Age: 30
End: 2020-06-30
Attending: PEDIATRICS
Payer: MEDICAID

## 2020-06-30 VITALS
OXYGEN SATURATION: 100 % | SYSTOLIC BLOOD PRESSURE: 118 MMHG | HEIGHT: 69 IN | TEMPERATURE: 97.2 F | WEIGHT: 145 LBS | DIASTOLIC BLOOD PRESSURE: 74 MMHG | BODY MASS INDEX: 21.48 KG/M2 | RESPIRATION RATE: 16 BRPM | HEART RATE: 78 BPM

## 2020-06-30 LAB
APPEARANCE UR: CLEAR
ATRIAL RATE: 99 BPM
BACTERIA URNS QL MICRO: NEGATIVE /HPF
BILIRUB UR QL: NEGATIVE
CALCULATED P AXIS, ECG09: 71 DEGREES
CALCULATED R AXIS, ECG10: 82 DEGREES
CALCULATED T AXIS, ECG11: 68 DEGREES
COLOR UR: NORMAL
D DIMER PPP FEU-MCNC: <0.19 MG/L FEU (ref 0–0.65)
DIAGNOSIS, 93000: NORMAL
EPITH CASTS URNS QL MICRO: NORMAL /LPF
GLUCOSE UR STRIP.AUTO-MCNC: NEGATIVE MG/DL
HCG UR QL: NEGATIVE
HGB UR QL STRIP: NEGATIVE
HYALINE CASTS URNS QL MICRO: NORMAL /LPF (ref 0–5)
KETONES UR QL STRIP.AUTO: NEGATIVE MG/DL
LEUKOCYTE ESTERASE UR QL STRIP.AUTO: NEGATIVE
NITRITE UR QL STRIP.AUTO: NEGATIVE
P-R INTERVAL, ECG05: 132 MS
PH UR STRIP: 6 [PH] (ref 5–8)
PROT UR STRIP-MCNC: NEGATIVE MG/DL
Q-T INTERVAL, ECG07: 352 MS
QRS DURATION, ECG06: 74 MS
QTC CALCULATION (BEZET), ECG08: 451 MS
RBC #/AREA URNS HPF: NORMAL /HPF (ref 0–5)
RHEUMATOID FACT SERPL-ACNC: <10 IU/ML
SP GR UR REFRACTOMETRY: 1.02 (ref 1–1.03)
UR CULT HOLD, URHOLD: NORMAL
UROBILINOGEN UR QL STRIP.AUTO: 1 EU/DL (ref 0.2–1)
VENTRICULAR RATE, ECG03: 99 BPM
WBC URNS QL MICRO: NORMAL /HPF (ref 0–4)

## 2020-06-30 PROCEDURE — 74011250636 HC RX REV CODE- 250/636: Performed by: PEDIATRICS

## 2020-06-30 PROCEDURE — 36415 COLL VENOUS BLD VENIPUNCTURE: CPT

## 2020-06-30 PROCEDURE — 96375 TX/PRO/DX INJ NEW DRUG ADDON: CPT

## 2020-06-30 PROCEDURE — 81001 URINALYSIS AUTO W/SCOPE: CPT

## 2020-06-30 PROCEDURE — 96374 THER/PROPH/DIAG INJ IV PUSH: CPT

## 2020-06-30 PROCEDURE — 81025 URINE PREGNANCY TEST: CPT

## 2020-06-30 PROCEDURE — 72148 MRI LUMBAR SPINE W/O DYE: CPT

## 2020-06-30 PROCEDURE — 86038 ANTINUCLEAR ANTIBODIES: CPT

## 2020-06-30 RX ORDER — PREDNISONE 20 MG/1
40 TABLET ORAL 2 TIMES DAILY
Qty: 20 TAB | Refills: 0 | Status: SHIPPED | OUTPATIENT
Start: 2020-06-30 | End: 2020-07-05

## 2020-06-30 RX ORDER — OXYCODONE AND ACETAMINOPHEN 5; 325 MG/1; MG/1
1 TABLET ORAL
Qty: 12 TAB | Refills: 0 | Status: SHIPPED | OUTPATIENT
Start: 2020-06-30 | End: 2020-07-04

## 2020-06-30 RX ORDER — NAPROXEN 500 MG/1
500 TABLET ORAL 2 TIMES DAILY WITH MEALS
Qty: 20 TAB | Refills: 0 | Status: SHIPPED | OUTPATIENT
Start: 2020-06-30

## 2020-06-30 RX ORDER — MORPHINE SULFATE 2 MG/ML
4 INJECTION, SOLUTION INTRAMUSCULAR; INTRAVENOUS
Status: COMPLETED | OUTPATIENT
Start: 2020-06-30 | End: 2020-06-30

## 2020-06-30 RX ORDER — FAMOTIDINE 20 MG/1
20 TABLET, FILM COATED ORAL 2 TIMES DAILY
Qty: 20 TAB | Refills: 0 | Status: SHIPPED | OUTPATIENT
Start: 2020-06-30 | End: 2020-07-10

## 2020-06-30 RX ORDER — KETOROLAC TROMETHAMINE 30 MG/ML
30 INJECTION, SOLUTION INTRAMUSCULAR; INTRAVENOUS
Status: COMPLETED | OUTPATIENT
Start: 2020-06-30 | End: 2020-06-30

## 2020-06-30 RX ADMIN — MORPHINE SULFATE 4 MG: 2 INJECTION, SOLUTION INTRAMUSCULAR; INTRAVENOUS at 03:25

## 2020-06-30 RX ADMIN — METHYLPREDNISOLONE SODIUM SUCCINATE 40 MG: 40 INJECTION, POWDER, FOR SOLUTION INTRAMUSCULAR; INTRAVENOUS at 03:25

## 2020-06-30 RX ADMIN — SODIUM CHLORIDE 1000 ML: 900 INJECTION, SOLUTION INTRAVENOUS at 00:47

## 2020-06-30 RX ADMIN — KETOROLAC TROMETHAMINE 30 MG: 30 INJECTION, SOLUTION INTRAMUSCULAR at 00:54

## 2020-06-30 NOTE — ED TRIAGE NOTES
From home with c/o joint pain and inability to stand/walk. States started in wrist 2 years ago and has progressed especially soince having a baby 6 months ago.

## 2020-06-30 NOTE — ED PROVIDER NOTES
Hx of anxiety, Has had some mild wrist and foot pain in past. Since having her last child 6 months ago she has had more pain, more frequent pain and worsening. Has not spoken to her PCP about this. Tonight with inability to stand or bend. Wrist pain is better today (which is most common area). Ankle are stiff. Cool and purpish color to hand and feet (More the feet tonight). Feels weak in her legs. Tonight most concerning was more pain in lower back, more on left side. The history is provided by the patient. Joint Pain    This is a chronic problem. The problem occurs daily. The problem has been gradually worsening. The pain is severe. Associated symptoms include limited range of motion, stiffness, tingling and back pain. Pertinent negatives include no numbness and no neck pain. She has tried nothing for the symptoms. There has been no history of extremity trauma. Family history is significant for rheumatoid arthritis. Denies any drug use, Is an occasional smoker. Has been followed for abd pain in past, normal colonoscopy per patient. No Injuries, fevers. N urine changes. Past Medical History:   Diagnosis Date    Anxiety        Past Surgical History:   Procedure Laterality Date    COLONOSCOPY N/A 11/27/2018    COLONOSCOPY performed by Bob Devries MD at [facility] ENDOSCOPY         History reviewed. No pertinent family history.     Social History     Socioeconomic History    Marital status:      Spouse name: Not on file    Number of children: Not on file    Years of education: Not on file    Highest education level: Not on file   Occupational History    Not on file   Social Needs    Financial resource strain: Not on file    Food insecurity     Worry: Not on file     Inability: Not on file    Transportation needs     Medical: Not on file     Non-medical: Not on file   Tobacco Use    Smoking status: Current Some Day Smoker    Smokeless tobacco: Never Used    Tobacco comment: Socially smokes   Substance and Sexual Activity    Alcohol use: Yes     Comment: very rare    Drug use: No    Sexual activity: Not on file   Lifestyle    Physical activity     Days per week: Not on file     Minutes per session: Not on file    Stress: Not on file   Relationships    Social connections     Talks on phone: Not on file     Gets together: Not on file     Attends Hinduism service: Not on file     Active member of club or organization: Not on file     Attends meetings of clubs or organizations: Not on file     Relationship status: Not on file    Intimate partner violence     Fear of current or ex partner: Not on file     Emotionally abused: Not on file     Physically abused: Not on file     Forced sexual activity: Not on file   Other Topics Concern    Not on file   Social History Narrative    ** Merged History Encounter **              ALLERGIES: Patient has no known allergies. Review of Systems   Constitutional: Negative for activity change, appetite change, chills and fever. HENT: Negative for sore throat. Eyes: Negative for visual disturbance. Respiratory: Negative for cough, chest tightness, shortness of breath and stridor. Cardiovascular: Positive for chest pain (some upper bilateral CP in the shower. Resolve now) and leg swelling (around both ankles). Gastrointestinal: Negative for abdominal pain, constipation, diarrhea, nausea and vomiting. Genitourinary: Negative for difficulty urinating, dysuria, frequency, hematuria, vaginal discharge and vaginal pain. Musculoskeletal: Positive for arthralgias, back pain, gait problem, joint pain, joint swelling (Maybe noticed on left hip when in the shower \"veins popping out\") and stiffness. Negative for neck pain. Skin: Positive for color change. Negative for pallor and rash. Neurological: Positive for tingling. Negative for light-headedness, numbness and headaches. Hematological: Does not bruise/bleed easily.    Psychiatric/Behavioral: Negative for confusion. The patient is nervous/anxious. Vitals:    06/29/20 2243 06/30/20 0009   BP: 131/86 (!) 144/93   Pulse: 78 (!) 112   Resp: 16 18   Temp: 98.5 °F (36.9 °C) 97.9 °F (36.6 °C)   SpO2: 99% 100%   Height: 5' 9\" (1.753 m)             Physical Exam   Physical Exam   Constitutional: Appears well-developed and well-nourished. Laying still, pain with movement of hips  HENT:   Head: NCAT  Ears: Right Ear: Tympanic membrane normal. Left Ear: Tympanic membrane normal.   Nose: Nose normal. No nasal discharge. Mouth/Throat: Mucous membranes are moist. Pharynx is normal.   Eyes: Conjunctivae are normal. Right eye exhibits no discharge. Left eye exhibits no discharge. PERRL   Neck: Normal range of motion. Neck supple. Cardiovascular: slightly elevated (102 on my exam) rate, regular rhythm, S1 normal and S2 normal. No murmur    2+ distal pulses   Pulmonary/Chest: Effort normal and breath sounds normal. No nasal flaring or stridor. No respiratory distress. no wheezes. no rhonchi. no rales. no retraction. mild tenderness across upper chest wall  Abdominal: Soft. . No tenderness. no guarding. No hernia. No masses or HSM  Musculoskeletal: Very tender with any torso movement at the lower back. No deformity or swelling. Tender to palpation over left later lumbar region. Mild purplish discoloration of hands, cool, non tender. Mild non-pitting swelling or ankles and feet, cool and purple. CR delayed to 4-5 seconds. Some tenderness to light touch on top of feet. Lymphadenopathy:   no cervical adenopathy. Neurological:  alert. normal strength (even in ankles). normal muscle tone. No focal deficits. CN intact  Skin: Skin is warm and dry. Capillary refill takes less than 3 seconds. Turgor is normal. No petechiae, no purpura and no rash noted. No cyanosis. MDM     Patient with hand/foot cyanosis, poor perfusion and intermittent pain in wrists and now ankles.  With stiffness and tingling this seems to be more descriptive of CRPS. Back pain has been mild and chronic, much worse now. Normal tone and maintained sensation. Movement limited by pain. Will place IV, bolus, labs and get imaging for lumbar spine now. Toradol and bolus    EKG down in triage  ED EKG interpretation:  Rhythm: normal sinus rhythm; and regular . Rate (approx.): 99; Axis: normal; QRS interval: normal ; ST/T wave: normal; QTc 451; This EKG was interpreted by Tracy Garcia MD, ED Provider. 3:39 AM  Recent Results (from the past 24 hour(s))   EKG, 12 LEAD, INITIAL    Collection Time: 06/29/20 10:55 PM   Result Value Ref Range    Ventricular Rate 99 BPM    Atrial Rate 99 BPM    P-R Interval 132 ms    QRS Duration 74 ms    Q-T Interval 352 ms    QTC Calculation (Bezet) 451 ms    Calculated P Axis 71 degrees    Calculated R Axis 82 degrees    Calculated T Axis 68 degrees    Diagnosis Normal sinus rhythm  No previous ECGs available      CBC WITH AUTOMATED DIFF    Collection Time: 06/29/20 11:01 PM   Result Value Ref Range    WBC 9.2 3.6 - 11.0 K/uL    RBC 4.78 3.80 - 5.20 M/uL    HGB 13.1 11.5 - 16.0 g/dL    HCT 42.2 35.0 - 47.0 %    MCV 88.3 80.0 - 99.0 FL    MCH 27.4 26.0 - 34.0 PG    MCHC 31.0 30.0 - 36.5 g/dL    RDW 12.9 11.5 - 14.5 %    PLATELET 121 479 - 214 K/uL    MPV 10.1 8.9 - 12.9 FL    NRBC 0.0 0  WBC    ABSOLUTE NRBC 0.00 0.00 - 0.01 K/uL    NEUTROPHILS 56 32 - 75 %    LYMPHOCYTES 34 12 - 49 %    MONOCYTES 8 5 - 13 %    EOSINOPHILS 2 0 - 7 %    BASOPHILS 0 0 - 1 %    IMMATURE GRANULOCYTES 0 0.0 - 0.5 %    ABS. NEUTROPHILS 5.0 1.8 - 8.0 K/UL    ABS. LYMPHOCYTES 3.2 0.8 - 3.5 K/UL    ABS. MONOCYTES 0.8 0.0 - 1.0 K/UL    ABS. EOSINOPHILS 0.2 0.0 - 0.4 K/UL    ABS. BASOPHILS 0.0 0.0 - 0.1 K/UL    ABS. IMM.  GRANS. 0.0 0.00 - 0.04 K/UL    DF AUTOMATED     METABOLIC PANEL, COMPREHENSIVE    Collection Time: 06/29/20 11:01 PM   Result Value Ref Range    Sodium 139 136 - 145 mmol/L    Potassium 3.9 3.5 - 5.1 mmol/L    Chloride 106 97 - 108 mmol/L    CO2 26 21 - 32 mmol/L    Anion gap 7 5 - 15 mmol/L    Glucose 92 65 - 100 mg/dL    BUN 7 6 - 20 MG/DL    Creatinine 0.64 0.55 - 1.02 MG/DL    BUN/Creatinine ratio 11 (L) 12 - 20      GFR est AA >60 >60 ml/min/1.73m2    GFR est non-AA >60 >60 ml/min/1.73m2    Calcium 9.0 8.5 - 10.1 MG/DL    Bilirubin, total 0.9 0.2 - 1.0 MG/DL    ALT (SGPT) 20 12 - 78 U/L    AST (SGOT) 13 (L) 15 - 37 U/L    Alk. phosphatase 90 45 - 117 U/L    Protein, total 7.6 6.4 - 8.2 g/dL    Albumin 4.0 3.5 - 5.0 g/dL    Globulin 3.6 2.0 - 4.0 g/dL    A-G Ratio 1.1 1.1 - 2.2     SED RATE (ESR)    Collection Time: 06/29/20 11:01 PM   Result Value Ref Range    Sed rate, automated 3 0 - 20 mm/hr   SAMPLES BEING HELD    Collection Time: 06/29/20 11:01 PM   Result Value Ref Range    SAMPLES BEING HELD 1BLU 1RED     COMMENT        Add-on orders for these samples will be processed based on acceptable specimen integrity and analyte stability, which may vary by analyte.    TROPONIN I    Collection Time: 06/29/20 11:01 PM   Result Value Ref Range    Troponin-I, Qt. <0.05 <0.05 ng/mL   RHEUMATOID FACTOR, QT    Collection Time: 06/29/20 11:01 PM   Result Value Ref Range    Rheumatoid factor <10 <15 IU/mL   D DIMER    Collection Time: 06/29/20 11:01 PM   Result Value Ref Range    D-dimer <0.19 0.00 - 0.65 mg/L FEU   URINALYSIS W/MICROSCOPIC    Collection Time: 06/30/20 12:49 AM   Result Value Ref Range    Color YELLOW/STRAW      Appearance CLEAR CLEAR      Specific gravity 1.021 1.003 - 1.030      pH (UA) 6.0 5.0 - 8.0      Protein Negative NEG mg/dL    Glucose Negative NEG mg/dL    Ketone Negative NEG mg/dL    Bilirubin Negative NEG      Blood Negative NEG      Urobilinogen 1.0 0.2 - 1.0 EU/dL    Nitrites Negative NEG      Leukocyte Esterase Negative NEG      WBC 0-4 0 - 4 /hpf    RBC 0-5 0 - 5 /hpf    Epithelial cells FEW FEW /lpf    Bacteria Negative NEG /hpf    Hyaline cast 0-2 0 - 5 /lpf   URINE CULTURE HOLD SAMPLE    Collection Time: 06/30/20 12:49 AM   Result Value Ref Range    Urine culture hold        Urine on hold in Microbiology dept for 2 days. If unpreserved urine is submitted, it cannot be used for addtional testing after 24 hours, recollection will be required. HCG URINE, QL. - POC    Collection Time: 06/30/20 12:53 AM   Result Value Ref Range    Pregnancy test,urine (POC) Negative NEG           Labs reassuring, ELISEO and RF pending. MRI shows  There is degenerative disc disease at L4-L5 and L5-S1. There are disc bulges at both levels, worse at L5-S1, where there is mild central canal stenosis and left lateral recess stenosis. There is no acute osseous abnormality. Pain improving. CR better and coloring normal. Able to walk now with mild pain. Adding morphine for pain and steroids for Deg disc disease with some radiculopathy. Spoke with family about both Dx and concerns. Referring to Rheum and Ortho. Instructed to return with worsening pain, inability to ambulate, worsening weakness, numbness, or other concerning symptoms. ICD-10-CM ICD-9-CM   1. Degenerative disc disease, lumbar M51.36 722.52   2. Central stenosis of spinal canal M48.00 724.00   3. Complex regional pain syndrome type 1, affecting unspecified site G90.50 337.20       Current Discharge Medication List      START taking these medications    Details   predniSONE (DELTASONE) 20 mg tablet Take 40 mg by mouth two (2) times a day for 5 days. With Breakfast  Qty: 20 Tab, Refills: 0      oxyCODONE-acetaminophen (Percocet) 5-325 mg per tablet Take 1 Tab by mouth every eight (8) hours as needed for Pain for up to 4 days. Max Daily Amount: 3 Tabs. Qty: 12 Tab, Refills: 0    Associated Diagnoses: Degenerative disc disease, lumbar; Complex regional pain syndrome type 1, affecting unspecified site      naproxen (NAPROSYN) 500 mg tablet Take 1 Tab by mouth two (2) times daily (with meals).   Qty: 20 Tab, Refills: 0      famotidine (Pepcid) 20 mg tablet Take 1 Tab by mouth two (2) times a day for 10 days. Qty: 20 Tab, Refills: 0             Follow-up Information     Follow up With Specialties Details Why Contact Info    Benny Hodgkins, MD Rheumatology Schedule an appointment as soon as possible for a visit for Complex pain syndrome [address] [place] [address]  [phone]      Hamlet Rodarte MD Neurosurgery Schedule an appointment as soon as possible for a visit lumbar spine disease [facility]  [phone]            I have reviewed discharge instructions with the parent. The parent verbalized understanding. 3:47 AM  Marilee Millan M.D.           Procedures

## 2020-06-30 NOTE — ED NOTES
Reassessment. Patient resting on the stretcher. VSS. Patient medicated per order. Family member at the bedside.

## 2020-06-30 NOTE — DISCHARGE INSTRUCTIONS
Herniated Disc: Care Instructions  Your Care Instructions     The bones that form the spine in your back are cushioned by small discs. If a disc is damaged, it may bulge or break open (herniate). A herniated disc can result from normal wear and tear as we age or from an injury or disease. If a herniated disc irritates or presses on a nerve, it can cause pain and numbness in your leg (sciatica) and/or back pain. You may be able to heal your herniated disc with a few weeks or months of rest, medicine, and exercises. In some cases, you may need surgery. Follow-up care is a key part of your treatment and safety. Be sure to make and go to all appointments, and call your doctor if you are having problems. It's also a good idea to know your test results and keep a list of the medicines you take. How can you care for yourself at home? · Take your medicines exactly as prescribed. Call your doctor if you think you are having a problem with your medicine. · Ask your doctor if you can take an over-the-counter pain medicine, such as acetaminophen (Tylenol), ibuprofen (Advil, Motrin), or naproxen (Aleve). Read and follow all instructions on the label. · Do not take two or more pain medicines at the same time unless the doctor told you to. Many pain medicines have acetaminophen, which is Tylenol. Too much acetaminophen (Tylenol) can be harmful. · Rest your back if your pain is severe. · Avoid movements and positions that increase your pain or numbness. · Try taking short walks and doing light activities that do not cause pain. Even if you are feeling some pain, it is important to keep your muscles active and strong. · Use heat or ice to relieve pain. ? To apply heat, put a warm water bottle, heating pad set on low, or warm cloth on your back. Do not go to sleep with a heating pad on your skin. ? To use ice, put ice or a cold pack on the area for 10 to 20 minutes at a time.  Put a thin cloth between the ice and your skin.  · Your doctor may recommend a physical therapy program, where you learn exercises to do at home. These exercises strengthen the muscles that support your lower back and prevent reinjury. · Stay at a healthy weight. This may reduce the load on your back. · Quit smoking if you smoke. If you need help quitting, talk to your doctor about stop-smoking programs and medicines. These can increase your chances of quitting for good. · To avoid hurting your back when lifting:  ? Lift with your legs, not your back, by squatting and bending your knees. Avoid bending forward at the waist when lifting. ? Rise slowly. ? Keep the load as close to your body as possible, at the level of your navel. ? Avoid turning or twisting your body while holding a heavy object. ? Get help if you need to lift a heavy object. Never lift a heavy object above shoulder level. When should you call for help? [id number] anytime you think you may need emergency care. For example, call if:  · You are unable to move a leg at all. Call your doctor now or seek immediate medical care if:  · You have new or worse symptoms in your arms, legs, chest, belly, or buttocks. Symptoms may include:  ? Numbness or tingling. ? Weakness. ? Pain. · You lose bladder or bowel control. Watch closely for changes in your health, and be sure to contact your doctor if:  · You are not getting better as expected. Where can you learn more? Go to http://jae-carmen.info/  Enter F534 in the search box to learn more about \"Herniated Disc: Care Instructions. \"  Current as of: March 2, 2020               Content Version: 12.5  © 9108-7155 Healthwise, Incorporated. Care instructions adapted under license by [employer] (which disclaims liability or warranty for this information).  If you have questions about a medical condition or this instruction, always ask your healthcare professional. [employer] disclaims any warranty or liability for your use of this information. Complex Regional Pain Syndrome: Care Instructions  Your Care Instructions     You may be recovering from an injured hand or other limb when the burning pain begins. For others, the pain begins without a clear cause. This is called complex regional pain syndrome or reflex sympathetic dystrophy. It is a flare-up of certain nerves that overreact to something. Doctors often do not know why it happens. For some people, the pain, swelling, and stiffness can be much worse than the original injury. This pain can last a long time--months or even years. Although it is an unusual condition, it is very real. You may feel better on some days than on others. Your skin may become very sensitive, and it may look blotchy or shiny. You may have trouble moving the limb. This condition sometimes goes away after some months. More likely, you will need a treatment plan to reduce symptoms, stop the pain from moving to different parts of your body, and prevent permanent damage. You may have to try several treatments before you find what works best for you. In some cases, shots into the nerve area (nerve blocks) seem to reduce symptoms. Pain medicine is only one part of successful treatment. Physical therapy and counseling can also help. Follow-up care is a key part of your treatment and safety. Be sure to make and go to all appointments, and call your doctor if you are having problems. It's also a good idea to know your test results and keep a list of the medicines you take. How can you care for yourself at home? · Try to relax and reduce stress. Relaxation techniques such as deep breathing or meditation can help. · Keep moving, if you can. Gentle, daily exercise, such as walking or swimming, can help reduce pain over the long term. · Apply a heating pad set on low or a warm cloth to the painful area. · Gently massage the painful area. · Get enough sleep.  Talk with your doctor if you have trouble sleeping because of pain. · Take your medicines exactly as prescribed. ? Your doctor may have prescribed medicines used to treat depression and seizures. These medicines can reduce your pain, help you sleep better, and improve your mood. ? If you are not taking a prescription medicine, ask your doctor if you can take an over-the-counter medicine. ? Do not take two or more pain medicines at the same time unless the doctor told you to. Many pain medicines have acetaminophen, which is Tylenol. Too much acetaminophen (Tylenol) can be harmful. · Keep a daily pain diary. Record how your moods, thoughts, sleep patterns, activities, and medicine affect your pain. Having a record can help you and your doctor find the best ways to treat your pain. · Try to think positively. Remember that your thoughts can affect your pain level. Do things you enjoy to make yourself feel better when you have pain. See a movie, read a book, listen to music, or spend time with a friend. Let your doctor know if you are feeling depressed or anxious. When should you call for help? Call your doctor now or seek immediate medical care if:  · Your pain is getting worse or is out of control. Watch closely for changes in your health, and be sure to contact your doctor if:  · You cannot sleep because of your pain and stiffness. · You are feeling down or blue, or you are not enjoying things like you once did. Where can you learn more? Go to http://jae-carmen.info/  Enter F376 in the search box to learn more about \"Complex Regional Pain Syndrome: Care Instructions. \"  Current as of: November 20, 2019               Content Version: 12.5  © 0291-1943 Healthwise, Incorporated. Care instructions adapted under license by [employer] (which disclaims liability or warranty for this information).  If you have questions about a medical condition or this instruction, always ask your healthcare professional. [address] any warranty or liability for your use of this information. Herniated Disc: Exercises  Introduction  Here are some examples of exercises for you to try. The exercises may be suggested for a condition or for rehabilitation. Start each exercise slowly. Ease off the exercises if you start to have pain. You will be told when to start these exercises and which ones will work best for you. How to stay safe  These exercises can help you move easier and feel better. But when you first start doing them, you may have more pain in your back. This is normal. But it is important to pay close attention to your pain during and after each exercise. · Keep doing these exercises if your pain stays the same or moves from your leg and buttock more toward the middle of your spine. Pain moving out of your leg and buttock is a good sign. · Stop doing these exercises if your pain gets worse in your leg and buttock. Stop if you start to have pain in your leg and buttock that you didn't have before. Be sure to do these exercises in the order they appear. Note how your pain changes before you move to the next one. If your pain is much worse right after exercise and stays worse the next day, do not do any of these exercises. How to do the exercises  1. Rest on belly   1. Lie on your stomach, with your head turned to the side. 2. Try to relax your lower back muscles as much as you can. 3. Continue to lie on your stomach for 2 minutes. · Keep your arms beside your body. · If that position bothers your neck, place your hands, one on top of the other, underneath your forehead. This will help support your head and neck. If lying in this position causes or increases pain down your leg, stop this exercise and do not do the next exercises. 2. Press-up back extension   1.  Lie on your stomach, with your face down and your elbows tucked into your sides and under your shoulders. 2. Press your elbows down into the floor to raise your upper back. 3. Hold this position for 15 to 30 seconds. 4. Repeat 2 to 4 times. · As you do this, relax your stomach muscles and allow your back to arch without using your back muscles. · Let your low back relax completely as you arch up. Don't let your hips or pelvis come off the floor. Then relax, and return to the start position. Over time, work up to staying in the press-up position for up to 2 minutes. If lying in this position causes or increases pain down your leg, stop this exercise and do not do the next exercises. 3. Full press-up back extension   1. Lie on your stomach with your face down, keeping your elbows tucked into your sides and under your shoulders. 2. Straighten your elbows, and push your upper body up as far as you can. 3. Hold this position for 5 seconds, and then relax. 4. Repeat 10 times. Allow your lower back to sag. Keep your hips, pelvis, and legs relaxed. Each time, try to raise your upper body a little higher and hold your arms a bit straighter. If lying in this position causes or increases pain down your leg, stop this exercise and do not do the next exercises. If you can't do this exercise, you may instead try the backward bend exercise that follows. 4. Backward bend   1. Stand with your feet hip-width apart, and don't lock your knees. 2. Place your hands in the small of your back. 3. Bend backward as far as you can, keeping your knees straight. 4. Repeat 2 to 4 times. Your toes should be pointing forward. Hold this position for 2 to 3 seconds. Then return to your starting position. Each time, try to bend backward a little farther, until you bend backward as far as you can. If standing in this position causes or increases pain down your leg, stop this exercise. Follow-up care is a key part of your treatment and safety.  Be sure to make and go to all appointments, and call your doctor if you are having problems. It's also a good idea to know your test results and keep a list of the medicines you take. Where can you learn more? Go to http://jae-carmen.info/  Enter Z594 in the search box to learn more about \"Herniated Disc: Exercises. \"  Current as of: March 2, 2020               Content Version: 12.5  © 9834-5547 [employer]. Care instructions adapted under license by [employer] (which disclaims liability or warranty for this information). If you have questions about a medical condition or this instruction, always ask your healthcare professional. [place] [address] any warranty or liability for your use of this information.

## 2020-06-30 NOTE — ED NOTES
DISCHARGE: Patient given discharge instructions including suggested FU with rheumatology and neurology, voiced understanding. EDUCATION: Patient educated on taking medications as prescribed, side effects of opioids, avoiding activities that increase pain, suggested exercises to help with avoiding stiffness, using warm/cold therapy, good hand/ cough hygiene and social distancing during [place], voiced understanding.

## 2020-06-30 NOTE — ED NOTES
Assessment complete. Patient resting on the stretcher. Family member at the bedside. Patient updated on POC at this time.

## 2020-06-30 NOTE — ED NOTES
Patient moved from [address] [place] [address] ED waiting room to Peds room 12. Patient assisted from the [facility] to the stretcher by this RN and family member. Patient settled in the stretcher, call light within reach.

## 2020-07-01 LAB
ANA HOMOGEN TITR SER: NORMAL {TITER}
ANA TITR SER IF: POSITIVE {TITER}
NOTE:, 016270: NORMAL

## 2020-07-08 ENCOUNTER — OFFICE VISIT (OUTPATIENT)
Dept: NEUROLOGY | Age: 30
End: 2020-07-08

## 2020-07-08 VITALS
BODY MASS INDEX: 22.05 KG/M2 | DIASTOLIC BLOOD PRESSURE: 80 MMHG | HEART RATE: 73 BPM | OXYGEN SATURATION: 98 % | RESPIRATION RATE: 16 BRPM | SYSTOLIC BLOOD PRESSURE: 120 MMHG | HEIGHT: 70 IN | WEIGHT: 154 LBS

## 2020-07-08 DIAGNOSIS — M19.90 INFLAMMATORY ARTHRITIS: ICD-10-CM

## 2020-07-08 DIAGNOSIS — G62.9 PERIPHERAL POLYNEUROPATHY: Primary | ICD-10-CM

## 2020-07-08 RX ORDER — DULOXETIN HYDROCHLORIDE 30 MG/1
30 CAPSULE, DELAYED RELEASE ORAL DAILY
Qty: 30 CAP | Refills: 1 | Status: SHIPPED | OUTPATIENT
Start: 2020-07-08 | End: 2020-09-06

## 2020-07-08 NOTE — PROGRESS NOTES
[facility]   NEW PATIENT EVALUATION/CONSULTATION       PATIENT NAME: Modesta Oliveros    MRN: [id number]    REASON FOR CONSULTATION: Diffuse joint pain, chronic low back pain     07/08/20    HISTORY OF PRESENT ILLNESS:  Modesta Oliveros is a 35 y.o. right-hand-dominant female referred by [facility] for evaluation of multitude of complaints. The patient reports being in her usual state of health during pregnancy and in the first few months following at approximately 6 months ago. However over the past several months she has began to note Friday of symptoms including diffuse joint pain, chronic low back pain, paresthesias of the forearms and hands, intermittently dropping things. Also complains of peripheral edema as well as skin color changes in the feet. She apparently dismissed these for some time being plenty busy at home and life in general.  However on the day of presentation to the ER she was eating dinner and a normal day and went up attempting to stand she says she cannot straighten her back due to stiffness and pain as well as trouble with ambulation. She had to be carried to the car and was brought to the ER. While in the ER she underwent a slew evaluations including ELISEO, rheumatoid factor and ESR all of which were unremarkable. She also underwent MRI of the lumbar spine which demonstrated multilevel degenerative changes with possibility of effacement of the S1 nerve root on the left-hand side. He was then discharged with diagnosis of \"chronic complex pain syndrome\" and recommended to follow-up with neurology as well as rheumatology. According to her , rheumatology referral remains pending. Today she again denies any of these symptoms before past several months and is unable to identify any illness or any other antecedent event other than postpartum state which may have triggered it. Endorses a history of chronic low back pain previously but states this is different. There is no autonomic dysfunction and there is no radicular signs in the cervical or lumbosacral regions denies any history of falls balance impairment or objective weakness. Likewise denies any joint swelling or erythema denies history of rashes or unexplained fevers. Does have history of abdominal pain is reported to have evidence for intestinal wall thickening for which she is currently being evaluated for Crohn's disease. PAST MEDICAL HISTORY:  Past Medical History:   Diagnosis Date    Anxiety        PAST SURGICAL HISTORY:  Past Surgical History:   Procedure Laterality Date    COLONOSCOPY N/A 11/27/2018    COLONOSCOPY performed [facility] Lance [facility] MD at [facility] ENDOSCOPY       FAMILY HISTORY:   Son with a history of type 1 diabetes  Denies significant family history otherwise    SOCIAL HISTORY:  Social History     Socioeconomic History    Marital status:      Spouse name: Not on file    Number of children: Not on file    Years of education: Not on file    Highest education level: Not on file   Tobacco Use    Smoking status: Current Some Day Smoker    Smokeless tobacco: Never Used    Tobacco comment: Socially smokes   Substance and Sexual Activity    Alcohol use: Yes     Comment: very rare    Drug use: No   Social History Narrative    ** Merged History Encounter **              MEDICATIONS:   Current Outpatient Medications   Medication Sig Dispense Refill    dextroamphetamine-amphetamine (ADDERALL) 10 mg tablet Take 10 mg by mouth two (2) times a day.  clonazePAM (KLONOPIN) 0.5 mg tablet Take 0.5 mg by mouth two (2) times daily as needed.  naproxen (NAPROSYN) 500 mg tablet Take 1 Tab by mouth two (2) times daily (with meals). 20 Tab 0    famotidine (Pepcid) 20 mg tablet Take 1 Tab by mouth two (2) times a day for 10 days. 20 Tab 0    ibuprofen (MOTRIN) 800 mg tablet Take 1 Tab by mouth every eight (8) hours as needed for Pain.  20 Tab 0    PNV No12-Iron-FA-DSS-OM-3 29 mg iron-1 mg -50 mg CPKD Take 1 Tab by mouth.  calcium carbonate (TUMS) 200 mg calcium (500 mg) chew Take 2 Tabs by mouth three (3) times daily.  acetaminophen (TYLENOL EXTRA STRENGTH) 500 mg tablet Take 500 mg by mouth two (2) times a day.  dextroamphetamine-amphetamine (ADDERALL) 10 mg tablet Take 10 mg by mouth two (2) times a day.  clonazePAM (KLONOPIN) 0.5 mg tablet Take 0.5 mg by mouth nightly as needed.  ALPRAZolam (XANAX) 0.5 mg tablet Take 0.5 mg by mouth daily.  ALPRAZolam (XANAX) 0.5 mg tablet Take 0.5 mg by mouth daily as needed for Anxiety.  acetaminophen (TYLENOL EXTRA STRENGTH) 500 mg tablet Take 1,000 mg by mouth two (2) times daily as needed for Pain. ALLERGIES:  No Known Allergies      REVIEW OF SYSTEMS:  Review of Systems   Constitutional: Negative for chills, fever and weight loss. HENT: Negative for congestion, ear pain, hearing loss, sore throat and tinnitus. Eyes: Negative for blurred vision, double vision and photophobia. Respiratory: Negative for cough, hemoptysis and wheezing. Cardiovascular: Negative for chest pain, palpitations and PND. Gastrointestinal: Negative for heartburn, nausea and vomiting. Genitourinary: Negative for dysuria, frequency and urgency. Musculoskeletal: Positive for back pain, joint pain and myalgias. Negative for falls and neck pain. Skin: Negative for rash. Neurological: Positive for tingling and sensory change. Negative for dizziness, tremors, speech change, focal weakness, seizures, loss of consciousness, weakness and headaches. Endo/Heme/Allergies: Negative. Psychiatric/Behavioral: The patient is nervous/anxious and has insomnia.           PHYSICAL EXAM:  Vital Signs:   Visit Vitals  /80 (BP 1 Location: Left arm, BP Patient Position: Sitting)   Pulse 73   Resp 16   Ht 5' 10\" (1.778 m)   Wt 69.9 kg (154 lb)   SpO2 98%   BMI 22.10 kg/m²        General Medical Exam:  General:  Well appearing, comfortable, in no apparent distress. Eyes/ENT: see cranial nerve examination. Neck: No masses appreciated. Full range of motion without tenderness. Respiratory:  Clear to auscultation, good air entry bilaterally. Cardiac:  Regular rate and rhythm, no murmur. GI:  Soft, non-tender, non-distended abdomen. Bowel sounds normal. No masses, organomegaly. Extremities:  No deformities, edema, or skin discoloration tenderness along right wrist joint as well as phalanges. Skin:  No rashes or lesions. Neurological:  · Mental Status:  Alert and oriented to person, place, and time. Attention intact. Speech clear without dysarthria hypophonia. Language fluent without evidence for aphasia. · Cranial Nerves:   CNII/III/IV/VI: visual fields full to confrontation, EOMI, PERRL, no ptosis or nystagmus. CN V: Facial sensation intact bilaterally   CN VII: Facial muscles symmetric and strong   CN VIII: Hearing intact to spoken voice. CN IX/X: Normal palatal movement   CN XI: Full strength shoulder shrug bilaterally   CN XII: Tongue protrusion full and midline without fasciculation or atrophy  · Motor: Normal tone and muscle bulk with no pronator drift. Individual muscle group testing:  Shoulder abduction:   Left:5/5   Right : 5/5   Shoulder adduction:   Left:5/5   Right : 5/5    Elbow flexion:      Left:5/5   Right : 5/5  Elbow extension:    Left:5/5   Right : 5/5   Wrist flexion:    Left:5/5   Right : 5/5  Wrist extension:    Left:5/5   Right : 5/5   Finger flexion:    Left:5/5   Right : 5/5    Finger extension:   Left:5/5   Right : 5/5   Hip flexion:     Left:5/5   Right : 5/5         Knee flexion:    Left:5/5   Right : 5/5    Knee extension:   Left:5/5   Right : 5/5    Dorsiflexion:     Left:5/5   Right : 5/5  Plantar flexion:    Left:5/5   Right : 5/5      · MSRs: No crossed adductors or clonus.          RIGHT  LEFT   Brachioradialis 1+ 1+   Biceps 1+ 1+   Triceps 1+ 1+   Knee 1+ 1+   Achilles 1+ 1+        Plantar response Downward Downward     · Sensation: Overall sensation is intact to fine touch and pinprick in the upper and lower extremities though diminished in median nerve distribution bilaterally. Tinel's is present at the elbow on the right Phalen's is present bilaterally. · Coordination: No dysmetria. Normal rapid alternating movements; finger-to-nose intact in bilateral upper extremities   · Gait: Primary gait and station intact. Tandem gait intact. Heel/toe ambulation intact. PERTINENT DATA:  ELISEO, anti-CCP negative     MRI Results (maximum last 3): ASSESSMENT:      Edward Alonso is a 35year old RH dominant female with history significant for anxiety referred from [facility] for evaluation of diffuse body pain, upper extremity paresthesias and chronic low back pain referred for further evaluation    PLAN:  Chronic low pain back  Appears largely musculoskeletal in nature, MRI offers possibility of nerve root compression though this is not definitively manifest on examination  Will refer for physical therapy locally, will also start on duloxetine 30mg to address musculoskeletal component  Will assess for radicular component with NCS/EMG    Upper extremity paresthesias:  Appears likely a combination of ulnar, median neuropathies in the upper extremities  Will assess response to duloxetine, evaluate further with NCS/EMG    Diffuse joint pain:  Has referral to rheumatology pending  RF, ELISEO unremarakble, will assess anti-CCP and otherwise defer to Rheumatology for evaluation    > 45 minutes were spent personally by me during this visit, of which > 50% of the time was spent counseling and coordinating care         I have discussed the diagnosis with the patient and the intended plan as seen in the above orders. Patient is in agreement. The patient has received an after-visit summary and questions were answered concerning future plans.  I have discussed medication side effects and warnings with the patient as well.    Ann Marie Francois MD       CC Referring provider:  Danni Rose MD

## 2020-07-16 LAB
14.3.3 ETA, RHEUM. ARTHRITIS: <0.2 NG/ML
CCP IGA+IGG SERPL IA-ACNC: <20 UNITS
RHEUMATOID FACT SERPL-ACNC: <14 UNITS/ML

## 2020-12-03 LAB
ANTIBODY SCREEN, EXTERNAL: NORMAL
HBSAG, EXTERNAL: NORMAL
HIV, EXTERNAL: NON REACTIVE
RPR, EXTERNAL: NON REACTIVE
RUBELLA, EXTERNAL: NORMAL
TYPE, ABO & RH, EXTERNAL: NORMAL

## 2020-12-05 LAB
ANTIBODY SCREEN, EXTERNAL: NEGATIVE
HBSAG, EXTERNAL: NEGATIVE
HIV, EXTERNAL: NON REACTIVE
RPR, EXTERNAL: NON REACTIVE
RUBELLA, EXTERNAL: NORMAL

## 2021-05-24 LAB — GRBS, EXTERNAL: NORMAL

## 2021-05-26 LAB — GRBS, EXTERNAL: NEGATIVE

## 2021-06-07 ENCOUNTER — HOSPITAL ENCOUNTER (INPATIENT)
Age: 31
LOS: 2 days | Discharge: HOME | DRG: 560 | End: 2021-06-09
Attending: SPECIALIST | Admitting: OBSTETRICS & GYNECOLOGY
Payer: MEDICAID

## 2021-06-07 ENCOUNTER — HOSPITAL ENCOUNTER (INPATIENT)
Age: 31
LOS: 1 days | Discharge: HOME | DRG: 560 | End: 2021-06-07
Attending: SPECIALIST | Admitting: SPECIALIST
Payer: MEDICAID

## 2021-06-07 PROBLEM — O13.9 PIH (PREGNANCY INDUCED HYPERTENSION): Status: ACTIVE | Noted: 2021-06-07

## 2021-06-07 LAB
ABO + RH BLD: NORMAL
AMPHET UR QL SCN: NEGATIVE
BARBITURATES UR QL SCN: NEGATIVE
BASOPHILS # BLD: 0 K/UL (ref 0–0.1)
BASOPHILS NFR BLD: 0 % (ref 0–1)
BENZODIAZ UR QL: NEGATIVE
BLOOD GROUP ANTIBODIES SERPL: NORMAL
CANNABINOIDS UR QL SCN: NEGATIVE
COCAINE UR QL SCN: NEGATIVE
COMMENT, HOLDF: NORMAL
DIFFERENTIAL METHOD BLD: ABNORMAL
DRUG SCRN COMMENT,DRGCM: NORMAL
EOSINOPHIL # BLD: 0.2 K/UL (ref 0–0.4)
EOSINOPHIL NFR BLD: 1 % (ref 0–7)
ERYTHROCYTE [DISTWIDTH] IN BLOOD BY AUTOMATED COUNT: 13.4 % (ref 11.5–14.5)
HCT VFR BLD AUTO: 36.5 % (ref 35–47)
HGB BLD-MCNC: 11.9 G/DL (ref 11.5–16)
IMM GRANULOCYTES # BLD AUTO: 0.1 K/UL (ref 0–0.04)
IMM GRANULOCYTES NFR BLD AUTO: 1 % (ref 0–0.5)
LYMPHOCYTES # BLD: 3.4 K/UL (ref 0.8–3.5)
LYMPHOCYTES NFR BLD: 23 % (ref 12–49)
MCH RBC QN AUTO: 28.3 PG (ref 26–34)
MCHC RBC AUTO-ENTMCNC: 32.6 G/DL (ref 30–36.5)
MCV RBC AUTO: 86.7 FL (ref 80–99)
METHADONE UR QL: NEGATIVE
MONOCYTES # BLD: 1.2 K/UL (ref 0–1)
MONOCYTES NFR BLD: 8 % (ref 5–13)
NEUTS SEG # BLD: 9.6 K/UL (ref 1.8–8)
NEUTS SEG NFR BLD: 67 % (ref 32–75)
NRBC # BLD: 0 K/UL (ref 0–0.01)
NRBC BLD-RTO: 0 PER 100 WBC
OPIATES UR QL: NEGATIVE
PCP UR QL: NEGATIVE
PLATELET # BLD AUTO: 316 K/UL (ref 150–400)
PMV BLD AUTO: 10.7 FL (ref 8.9–12.9)
RBC # BLD AUTO: 4.21 M/UL (ref 3.8–5.2)
SAMPLES BEING HELD,HOLD: NORMAL
SPECIMEN EXP DATE BLD: NORMAL
WBC # BLD AUTO: 14.5 K/UL (ref 3.6–11)

## 2021-06-07 PROCEDURE — 36415 COLL VENOUS BLD VENIPUNCTURE: CPT

## 2021-06-07 PROCEDURE — 74011250636 HC RX REV CODE- 250/636: Performed by: OBSTETRICS & GYNECOLOGY

## 2021-06-07 PROCEDURE — 74011250636 HC RX REV CODE- 250/636

## 2021-06-07 PROCEDURE — 86901 BLOOD TYPING SEROLOGIC RH(D): CPT

## 2021-06-07 PROCEDURE — 84550 ASSAY OF BLOOD/URIC ACID: CPT

## 2021-06-07 PROCEDURE — 59200 INSERT CERVICAL DILATOR: CPT

## 2021-06-07 PROCEDURE — 65410000002 HC RM PRIVATE OB

## 2021-06-07 PROCEDURE — 85025 COMPLETE CBC W/AUTO DIFF WBC: CPT

## 2021-06-07 PROCEDURE — 83615 LACTATE (LD) (LDH) ENZYME: CPT

## 2021-06-07 PROCEDURE — 80307 DRUG TEST PRSMV CHEM ANLYZR: CPT

## 2021-06-07 PROCEDURE — 80053 COMPREHEN METABOLIC PANEL: CPT

## 2021-06-07 PROCEDURE — 74011250637 HC RX REV CODE- 250/637: Performed by: ADVANCED PRACTICE MIDWIFE

## 2021-06-07 PROCEDURE — 87635 SARS-COV-2 COVID-19 AMP PRB: CPT

## 2021-06-07 PROCEDURE — 75410000002 HC LABOR FEE PER 1 HR

## 2021-06-07 RX ORDER — SODIUM CHLORIDE 0.9 % (FLUSH) 0.9 %
5-40 SYRINGE (ML) INJECTION EVERY 8 HOURS
Status: DISCONTINUED | OUTPATIENT
Start: 2021-06-07 | End: 2021-06-08 | Stop reason: HOSPADM

## 2021-06-07 RX ORDER — OXYTOCIN/RINGER'S LACTATE 30/500 ML
0-20 PLASTIC BAG, INJECTION (ML) INTRAVENOUS
Status: DISCONTINUED | OUTPATIENT
Start: 2021-06-08 | End: 2021-06-08 | Stop reason: HOSPADM

## 2021-06-07 RX ORDER — LIDOCAINE HYDROCHLORIDE 10 MG/ML
10 INJECTION, SOLUTION EPIDURAL; INFILTRATION; INTRACAUDAL; PERINEURAL ONCE
Status: ACTIVE | OUTPATIENT
Start: 2021-06-07 | End: 2021-06-08

## 2021-06-07 RX ORDER — LIDOCAINE HYDROCHLORIDE 10 MG/ML
10 INJECTION INFILTRATION; PERINEURAL ONCE
Status: DISCONTINUED | OUTPATIENT
Start: 2021-06-07 | End: 2021-06-07

## 2021-06-07 RX ORDER — OXYTOCIN/RINGER'S LACTATE 30/500 ML
10 PLASTIC BAG, INJECTION (ML) INTRAVENOUS AS NEEDED
Status: DISCONTINUED | OUTPATIENT
Start: 2021-06-07 | End: 2021-06-08 | Stop reason: HOSPADM

## 2021-06-07 RX ORDER — SODIUM CHLORIDE, SODIUM LACTATE, POTASSIUM CHLORIDE, CALCIUM CHLORIDE 600; 310; 30; 20 MG/100ML; MG/100ML; MG/100ML; MG/100ML
125 INJECTION, SOLUTION INTRAVENOUS CONTINUOUS
Status: DISCONTINUED | OUTPATIENT
Start: 2021-06-07 | End: 2021-06-08 | Stop reason: HOSPADM

## 2021-06-07 RX ORDER — ONDANSETRON 2 MG/ML
4 INJECTION INTRAMUSCULAR; INTRAVENOUS
Status: DISCONTINUED | OUTPATIENT
Start: 2021-06-07 | End: 2021-06-09 | Stop reason: HOSPADM

## 2021-06-07 RX ORDER — ZOLPIDEM TARTRATE 5 MG/1
5 TABLET ORAL
Status: DISCONTINUED | OUTPATIENT
Start: 2021-06-07 | End: 2021-06-08 | Stop reason: HOSPADM

## 2021-06-07 RX ORDER — SODIUM CHLORIDE 0.9 % (FLUSH) 0.9 %
5-40 SYRINGE (ML) INJECTION AS NEEDED
Status: DISCONTINUED | OUTPATIENT
Start: 2021-06-07 | End: 2021-06-09 | Stop reason: HOSPADM

## 2021-06-07 RX ORDER — NALOXONE HYDROCHLORIDE 0.4 MG/ML
0.4 INJECTION, SOLUTION INTRAMUSCULAR; INTRAVENOUS; SUBCUTANEOUS AS NEEDED
Status: DISCONTINUED | OUTPATIENT
Start: 2021-06-07 | End: 2021-06-09 | Stop reason: HOSPADM

## 2021-06-07 RX ORDER — OXYCODONE HYDROCHLORIDE 5 MG/1
5 TABLET ORAL
Status: DISCONTINUED | OUTPATIENT
Start: 2021-06-07 | End: 2021-06-09 | Stop reason: HOSPADM

## 2021-06-07 RX ORDER — NALOXONE HYDROCHLORIDE 0.4 MG/ML
0.4 INJECTION, SOLUTION INTRAMUSCULAR; INTRAVENOUS; SUBCUTANEOUS AS NEEDED
Status: DISCONTINUED | OUTPATIENT
Start: 2021-06-07 | End: 2021-06-08 | Stop reason: HOSPADM

## 2021-06-07 RX ORDER — ACETAMINOPHEN 325 MG/1
650 TABLET ORAL
Status: DISCONTINUED | OUTPATIENT
Start: 2021-06-07 | End: 2021-06-08 | Stop reason: HOSPADM

## 2021-06-07 RX ORDER — BUTORPHANOL TARTRATE 2 MG/ML
INJECTION INTRAMUSCULAR; INTRAVENOUS
Status: COMPLETED
Start: 2021-06-07 | End: 2021-06-07

## 2021-06-07 RX ORDER — SODIUM CHLORIDE 0.9 % (FLUSH) 0.9 %
5-40 SYRINGE (ML) INJECTION AS NEEDED
Status: DISCONTINUED | OUTPATIENT
Start: 2021-06-07 | End: 2021-06-08 | Stop reason: HOSPADM

## 2021-06-07 RX ORDER — SODIUM CHLORIDE 0.9 % (FLUSH) 0.9 %
5-40 SYRINGE (ML) INJECTION EVERY 8 HOURS
Status: DISCONTINUED | OUTPATIENT
Start: 2021-06-07 | End: 2021-06-09 | Stop reason: HOSPADM

## 2021-06-07 RX ORDER — PROMETHAZINE HYDROCHLORIDE 25 MG/1
25 TABLET ORAL
COMMUNITY

## 2021-06-07 RX ORDER — BUTORPHANOL TARTRATE 2 MG/ML
2 INJECTION INTRAMUSCULAR; INTRAVENOUS
Status: DISCONTINUED | OUTPATIENT
Start: 2021-06-07 | End: 2021-06-09 | Stop reason: HOSPADM

## 2021-06-07 RX ORDER — ONDANSETRON 2 MG/ML
4 INJECTION INTRAMUSCULAR; INTRAVENOUS
Status: DISCONTINUED | OUTPATIENT
Start: 2021-06-07 | End: 2021-06-08 | Stop reason: HOSPADM

## 2021-06-07 RX ORDER — OXYTOCIN/RINGER'S LACTATE 30/500 ML
87.3 PLASTIC BAG, INJECTION (ML) INTRAVENOUS AS NEEDED
Status: DISCONTINUED | OUTPATIENT
Start: 2021-06-07 | End: 2021-06-08 | Stop reason: HOSPADM

## 2021-06-07 RX ORDER — CALCIUM CARBONATE 200(500)MG
400 TABLET,CHEWABLE ORAL
Status: DISCONTINUED | OUTPATIENT
Start: 2021-06-07 | End: 2021-06-08 | Stop reason: HOSPADM

## 2021-06-07 RX ORDER — ONDANSETRON 4 MG/1
4 TABLET, FILM COATED ORAL
COMMUNITY

## 2021-06-07 RX ORDER — ACETAMINOPHEN 325 MG/1
650 TABLET ORAL
Status: DISCONTINUED | OUTPATIENT
Start: 2021-06-07 | End: 2021-06-09 | Stop reason: HOSPADM

## 2021-06-07 RX ORDER — BUTORPHANOL TARTRATE 2 MG/ML
1 INJECTION INTRAMUSCULAR; INTRAVENOUS
Status: DISCONTINUED | OUTPATIENT
Start: 2021-06-07 | End: 2021-06-08 | Stop reason: HOSPADM

## 2021-06-07 RX ADMIN — BUTORPHANOL TARTRATE 2 MG: 2 INJECTION, SOLUTION INTRAMUSCULAR; INTRAVENOUS at 22:35

## 2021-06-07 RX ADMIN — DINOPROSTONE 10 MG: 10 INSERT VAGINAL at 17:59

## 2021-06-07 RX ADMIN — Medication 10 ML: at 23:00

## 2021-06-07 RX ADMIN — ONDANSETRON 4 MG: 2 INJECTION INTRAMUSCULAR; INTRAVENOUS at 22:33

## 2021-06-07 NOTE — H&P
History & Physical    Name: Naif Vaughan MRN: [id number]  SSN: [id number]    YOB: 1986  Age: 30 y.o. Sex: female      Subjective:     Estimated Date of Delivery: 21  OB History    Para Term  AB Living   1             SAB TAB Ectopic Molar Multiple Live Births                    # Outcome Date GA Lbr [place]/2nd Weight Sex Delivery Anes PTL Lv   1 Current                Ms. Queen Pac is admitted with pregnancy at 37w3d for induction of labor due to favorable cervix at term and hypertension. Prenatal course was complicated by pregnancy induced hypertension. Please see prenatal records for details. No past medical history on file. No past surgical history on file. Social History     Occupational History    Not on file   Tobacco Use    Smoking status: Current Every Day Smoker     Packs/day: 0.25    Smokeless tobacco: Never Used   Substance and Sexual Activity    Alcohol use: Yes     Comment: 2-3x per week    Drug use: Yes     Types: Marijuana    Sexual activity: Not on file     No family history on file. No Known Allergies  Prior to Admission medications    Not on File        Review of Systems: A comprehensive review of systems was negative except for: Constitutional: positive for malaise    Objective:     Vitals: There were no vitals filed for this visit. Physical Exam:  Patient without distress.   Heart: Regular rate and rhythm or S1S2 present  Lung: clear to auscultation throughout lung fields, no wheezes, no rales, no rhonchi and normal respiratory effort  Abdomen: soft, nontender  Fundus: soft and non tender  Perineum: blood absent, amniotic fluid absent  Cervical Exam: 0 cm dilated    40% effaced    -3 station    Presenting Part: cephalic  Cervical Position: posterior  Consistency: Soft  Lower Extremities:  - Edema No  Membranes:  Intact  Fetal Heart Rate: Reactive  Baseline: 140 per minute  Variability: moderate  Accelerations: yes  Decelerations: variable  Uterine contractions: irregular          Prenatal Labs:   Lab Results   Component Value Date/Time    Rubella, External Immune 12/03/2020 12:00 AM    HBsAg, External neg 12/03/2020 12:00 AM    HIV, External non reactive 12/03/2020 12:00 AM    RPR, External non reactive 12/03/2020 12:00 AM    ABO,Rh O pos 12/03/2020 12:00 AM    GrBStrep, External neg 05/24/2021 12:00 AM       Impression/Plan:     Active Problems:    PIH (pregnancy induced hypertension) (6/7/2021)      Encounter for induction of labor (6/7/2021)         Plan: Admit for induction of labor. Group B Strep negative. [address] labs obtained. Cervidil placed. Will plan to remove at 0500 and start pitocin at 0600. Plan discussed with patient and bedside nurse.

## 2021-06-07 NOTE — PROGRESS NOTES
Pt arrived from home for IOL tomorrow am for [facility], pt here overnight for PI labs, NST then start pitocin in the am.  Pt is a , 37w3d.

## 2021-06-07 NOTE — PROGRESS NOTES
Problem: Vaginal Delivery: Day of Deliver-Laboring  Goal: Off Pathway (Use only if patient is Off Pathway)  Outcome: Progressing Towards Goal

## 2021-06-08 ENCOUNTER — ANESTHESIA (OUTPATIENT)
Dept: LABOR AND DELIVERY | Age: 31
DRG: 560 | End: 2021-06-08
Payer: MEDICAID

## 2021-06-08 ENCOUNTER — ANESTHESIA EVENT (OUTPATIENT)
Dept: LABOR AND DELIVERY | Age: 31
DRG: 560 | End: 2021-06-08
Payer: MEDICAID

## 2021-06-08 VITALS
OXYGEN SATURATION: 98 % | DIASTOLIC BLOOD PRESSURE: 68 MMHG | WEIGHT: 214 LBS | HEART RATE: 95 BPM | BODY MASS INDEX: 41.79 KG/M2 | TEMPERATURE: 98.2 F | SYSTOLIC BLOOD PRESSURE: 120 MMHG

## 2021-06-08 PROBLEM — U07.1 COVID-19 AFFECTING PREGNANCY, ANTEPARTUM: Status: ACTIVE | Noted: 2021-06-08

## 2021-06-08 PROBLEM — O98.519 COVID-19 AFFECTING PREGNANCY, ANTEPARTUM: Status: ACTIVE | Noted: 2021-06-08

## 2021-06-08 LAB
ABO + RH BLD: NORMAL
ALBUMIN SERPL-MCNC: 2.7 G/DL (ref 3.5–5)
ALBUMIN/GLOB SERPL: 0.7 {RATIO} (ref 1.1–2.2)
ALP SERPL-CCNC: 151 U/L (ref 45–117)
ALT SERPL-CCNC: 14 U/L (ref 12–78)
ANION GAP SERPL CALC-SCNC: 8 MMOL/L (ref 5–15)
AST SERPL-CCNC: 13 U/L (ref 15–37)
BASOPHILS # BLD: 0 K/UL (ref 0–0.1)
BASOPHILS NFR BLD: 0 % (ref 0–1)
BILIRUB SERPL-MCNC: 0.6 MG/DL (ref 0.2–1)
BLOOD GROUP ANTIBODIES SERPL: NORMAL
BUN SERPL-MCNC: 2 MG/DL (ref 6–20)
BUN/CREAT SERPL: 5 (ref 12–20)
CALCIUM SERPL-MCNC: 8.6 MG/DL (ref 8.5–10.1)
CHLORIDE SERPL-SCNC: 109 MMOL/L (ref 97–108)
CO2 SERPL-SCNC: 21 MMOL/L (ref 21–32)
COVID-19 RAPID TEST, COVR: DETECTED
CREAT SERPL-MCNC: 0.38 MG/DL (ref 0.55–1.02)
DIFFERENTIAL METHOD BLD: ABNORMAL
EOSINOPHIL # BLD: 0.2 K/UL (ref 0–0.4)
EOSINOPHIL NFR BLD: 1 % (ref 0–7)
ERYTHROCYTE [DISTWIDTH] IN BLOOD BY AUTOMATED COUNT: 13.6 % (ref 11.5–14.5)
GLOBULIN SER CALC-MCNC: 4 G/DL (ref 2–4)
GLUCOSE SERPL-MCNC: 92 MG/DL (ref 65–100)
HCT VFR BLD AUTO: 37.4 % (ref 35–47)
HGB BLD-MCNC: 12.2 G/DL (ref 11.5–16)
IMM GRANULOCYTES # BLD AUTO: 0 K/UL (ref 0–0.04)
IMM GRANULOCYTES NFR BLD AUTO: 0 % (ref 0–0.5)
LDH SERPL L TO P-CCNC: 186 U/L (ref 81–246)
LYMPHOCYTES # BLD: 3.5 K/UL (ref 0.8–3.5)
LYMPHOCYTES NFR BLD: 22 % (ref 12–49)
MCH RBC QN AUTO: 28.5 PG (ref 26–34)
MCHC RBC AUTO-ENTMCNC: 32.6 G/DL (ref 30–36.5)
MCV RBC AUTO: 87.4 FL (ref 80–99)
MONOCYTES # BLD: 0.8 K/UL (ref 0–1)
MONOCYTES NFR BLD: 5 % (ref 5–13)
NEUTS SEG # BLD: 11.2 K/UL (ref 1.8–8)
NEUTS SEG NFR BLD: 70 % (ref 32–75)
NRBC # BLD: 0 K/UL (ref 0–0.01)
NRBC BLD-RTO: 0 PER 100 WBC
OTHER CELLS NFR BLD MANUAL: 2 %
PLATELET # BLD AUTO: 307 K/UL (ref 150–400)
PMV BLD AUTO: 10.6 FL (ref 8.9–12.9)
POTASSIUM SERPL-SCNC: 3.7 MMOL/L (ref 3.5–5.1)
PROT SERPL-MCNC: 6.7 G/DL (ref 6.4–8.2)
RBC # BLD AUTO: 4.28 M/UL (ref 3.8–5.2)
RBC MORPH BLD: ABNORMAL
SARS-COV-2, COV2: NORMAL
SARS-COV-2, XPLCVT: NOT DETECTED
SODIUM SERPL-SCNC: 138 MMOL/L (ref 136–145)
SOURCE, COVRS: ABNORMAL
SOURCE, COVRS: NORMAL
SPECIMEN EXP DATE BLD: NORMAL
URATE SERPL-MCNC: 3.2 MG/DL (ref 2.6–6)
WBC # BLD AUTO: 16 K/UL (ref 3.6–11)

## 2021-06-08 PROCEDURE — 74011000250 HC RX REV CODE- 250: Performed by: ANESTHESIOLOGY

## 2021-06-08 PROCEDURE — 74011250637 HC RX REV CODE- 250/637: Performed by: OBSTETRICS & GYNECOLOGY

## 2021-06-08 PROCEDURE — 74011250636 HC RX REV CODE- 250/636: Performed by: OBSTETRICS & GYNECOLOGY

## 2021-06-08 PROCEDURE — 65410000002 HC RM PRIVATE OB

## 2021-06-08 PROCEDURE — 75410000000 HC DELIVERY VAGINAL/SINGLE

## 2021-06-08 PROCEDURE — 00HU03Z INSERTION OF INFUSION DEVICE INTO SPINAL CANAL, OPEN APPROACH: ICD-10-PCS | Performed by: ANESTHESIOLOGY

## 2021-06-08 PROCEDURE — 3E0P7VZ INTRODUCTION OF HORMONE INTO FEMALE REPRODUCTIVE, VIA NATURAL OR ARTIFICIAL OPENING: ICD-10-PCS | Performed by: OBSTETRICS & GYNECOLOGY

## 2021-06-08 PROCEDURE — 77010026065 HC OXYGEN MINIMUM MEDICAL AIR

## 2021-06-08 PROCEDURE — U0005 INFEC AGEN DETEC AMPLI PROBE: HCPCS

## 2021-06-08 PROCEDURE — 75410000002 HC LABOR FEE PER 1 HR

## 2021-06-08 PROCEDURE — 74011000250 HC RX REV CODE- 250

## 2021-06-08 PROCEDURE — 75410000003 HC RECOV DEL/VAG/CSECN EA 0.5 HR

## 2021-06-08 RX ORDER — SWAB
1 SWAB, NON-MEDICATED MISCELLANEOUS DAILY
Status: DISCONTINUED | OUTPATIENT
Start: 2021-06-08 | End: 2021-06-09 | Stop reason: HOSPADM

## 2021-06-08 RX ORDER — OXYTOCIN/RINGER'S LACTATE 30/500 ML
0-20 PLASTIC BAG, INJECTION (ML) INTRAVENOUS
Status: DISCONTINUED | OUTPATIENT
Start: 2021-06-08 | End: 2021-06-09 | Stop reason: HOSPADM

## 2021-06-08 RX ORDER — SODIUM CHLORIDE 0.9 % (FLUSH) 0.9 %
5-40 SYRINGE (ML) INJECTION EVERY 8 HOURS
Status: DISCONTINUED | OUTPATIENT
Start: 2021-06-08 | End: 2021-06-09 | Stop reason: HOSPADM

## 2021-06-08 RX ORDER — SODIUM CHLORIDE 0.9 % (FLUSH) 0.9 %
5-40 SYRINGE (ML) INJECTION AS NEEDED
Status: DISCONTINUED | OUTPATIENT
Start: 2021-06-08 | End: 2021-06-09 | Stop reason: HOSPADM

## 2021-06-08 RX ORDER — OXYTOCIN/RINGER'S LACTATE 30/500 ML
10 PLASTIC BAG, INJECTION (ML) INTRAVENOUS AS NEEDED
Status: COMPLETED | OUTPATIENT
Start: 2021-06-08 | End: 2021-06-08

## 2021-06-08 RX ORDER — ZOLPIDEM TARTRATE 5 MG/1
5 TABLET ORAL
Status: DISCONTINUED | OUTPATIENT
Start: 2021-06-08 | End: 2021-06-09 | Stop reason: HOSPADM

## 2021-06-08 RX ORDER — FENTANYL/BUPIVACAINE/NS/PF 2-1250MCG
PREFILLED PUMP RESERVOIR EPIDURAL
Status: COMPLETED
Start: 2021-06-08 | End: 2021-06-08

## 2021-06-08 RX ORDER — OXYCODONE HYDROCHLORIDE 5 MG/1
5 TABLET ORAL
Status: DISCONTINUED | OUTPATIENT
Start: 2021-06-08 | End: 2021-06-09 | Stop reason: HOSPADM

## 2021-06-08 RX ORDER — OXYTOCIN/RINGER'S LACTATE 30/500 ML
10 PLASTIC BAG, INJECTION (ML) INTRAVENOUS AS NEEDED
Status: DISCONTINUED | OUTPATIENT
Start: 2021-06-08 | End: 2021-06-09 | Stop reason: HOSPADM

## 2021-06-08 RX ORDER — DIPHENHYDRAMINE HCL 25 MG
25 CAPSULE ORAL
Status: DISCONTINUED | OUTPATIENT
Start: 2021-06-08 | End: 2021-06-09 | Stop reason: HOSPADM

## 2021-06-08 RX ORDER — EPHEDRINE SULFATE/0.9% NACL/PF 50 MG/5 ML
10 SYRINGE (ML) INTRAVENOUS
Status: COMPLETED | OUTPATIENT
Start: 2021-06-08 | End: 2021-06-08

## 2021-06-08 RX ORDER — HYDROCORTISONE ACETATE PRAMOXINE HCL 2.5; 1 G/100G; G/100G
CREAM TOPICAL AS NEEDED
Status: DISCONTINUED | OUTPATIENT
Start: 2021-06-08 | End: 2021-06-09 | Stop reason: HOSPADM

## 2021-06-08 RX ORDER — OXYTOCIN/RINGER'S LACTATE 30/500 ML
87.3 PLASTIC BAG, INJECTION (ML) INTRAVENOUS AS NEEDED
Status: DISCONTINUED | OUTPATIENT
Start: 2021-06-08 | End: 2021-06-09 | Stop reason: HOSPADM

## 2021-06-08 RX ORDER — ACETAMINOPHEN 325 MG/1
650 TABLET ORAL
Status: DISCONTINUED | OUTPATIENT
Start: 2021-06-08 | End: 2021-06-09 | Stop reason: HOSPADM

## 2021-06-08 RX ORDER — BUPIVACAINE HYDROCHLORIDE AND EPINEPHRINE 2.5; 5 MG/ML; UG/ML
INJECTION, SOLUTION EPIDURAL; INFILTRATION; INTRACAUDAL; PERINEURAL AS NEEDED
Status: DISCONTINUED | OUTPATIENT
Start: 2021-06-08 | End: 2021-06-08 | Stop reason: HOSPADM

## 2021-06-08 RX ORDER — IBUPROFEN 400 MG/1
800 TABLET ORAL EVERY 8 HOURS
Status: DISCONTINUED | OUTPATIENT
Start: 2021-06-08 | End: 2021-06-09 | Stop reason: HOSPADM

## 2021-06-08 RX ORDER — ONDANSETRON 4 MG/1
4 TABLET, ORALLY DISINTEGRATING ORAL
Status: ACTIVE | OUTPATIENT
Start: 2021-06-08 | End: 2021-06-09

## 2021-06-08 RX ORDER — BUPIVACAINE HYDROCHLORIDE AND EPINEPHRINE 2.5; 5 MG/ML; UG/ML
INJECTION, SOLUTION EPIDURAL; INFILTRATION; INTRACAUDAL; PERINEURAL
Status: COMPLETED
Start: 2021-06-08 | End: 2021-06-08

## 2021-06-08 RX ORDER — SODIUM CHLORIDE, SODIUM LACTATE, POTASSIUM CHLORIDE, CALCIUM CHLORIDE 600; 310; 30; 20 MG/100ML; MG/100ML; MG/100ML; MG/100ML
125 INJECTION, SOLUTION INTRAVENOUS CONTINUOUS
Status: DISCONTINUED | OUTPATIENT
Start: 2021-06-08 | End: 2021-06-09 | Stop reason: HOSPADM

## 2021-06-08 RX ORDER — EPHEDRINE SULFATE/0.9% NACL/PF 50 MG/5 ML
SYRINGE (ML) INTRAVENOUS
Status: DISPENSED
Start: 2021-06-08 | End: 2021-06-08

## 2021-06-08 RX ORDER — FENTANYL/BUPIVACAINE/NS/PF 2-1250MCG
1-16 PREFILLED PUMP RESERVOIR EPIDURAL CONTINUOUS
Status: DISCONTINUED | OUTPATIENT
Start: 2021-06-08 | End: 2021-06-09 | Stop reason: HOSPADM

## 2021-06-08 RX ORDER — DOCUSATE SODIUM 100 MG/1
100 CAPSULE, LIQUID FILLED ORAL
Status: DISCONTINUED | OUTPATIENT
Start: 2021-06-08 | End: 2021-06-09 | Stop reason: HOSPADM

## 2021-06-08 RX ORDER — OXYTOCIN/RINGER'S LACTATE 30/500 ML
87.3 PLASTIC BAG, INJECTION (ML) INTRAVENOUS AS NEEDED
Status: COMPLETED | OUTPATIENT
Start: 2021-06-08 | End: 2021-06-08

## 2021-06-08 RX ORDER — NALOXONE HYDROCHLORIDE 0.4 MG/ML
0.4 INJECTION, SOLUTION INTRAMUSCULAR; INTRAVENOUS; SUBCUTANEOUS AS NEEDED
Status: DISCONTINUED | OUTPATIENT
Start: 2021-06-08 | End: 2021-06-09 | Stop reason: HOSPADM

## 2021-06-08 RX ORDER — HYDROCHLOROTHIAZIDE 25 MG/1
25 TABLET ORAL 2 TIMES DAILY
Status: DISCONTINUED | OUTPATIENT
Start: 2021-06-08 | End: 2021-06-08 | Stop reason: HOSPADM

## 2021-06-08 RX ADMIN — BUPIVACAINE HYDROCHLORIDE AND EPINEPHRINE 3 ML: 2.5; 5 INJECTION, SOLUTION EPIDURAL; INFILTRATION; INTRACAUDAL; PERINEURAL at 03:50

## 2021-06-08 RX ADMIN — IBUPROFEN 800 MG: 400 TABLET, FILM COATED ORAL at 21:56

## 2021-06-08 RX ADMIN — SODIUM CHLORIDE, POTASSIUM CHLORIDE, SODIUM LACTATE AND CALCIUM CHLORIDE 1000 ML/HR: 600; 310; 30; 20 INJECTION, SOLUTION INTRAVENOUS at 03:10

## 2021-06-08 RX ADMIN — BUPIVACAINE HYDROCHLORIDE AND EPINEPHRINE 5 ML: 2.5; 5 INJECTION, SOLUTION EPIDURAL; INFILTRATION; INTRACAUDAL; PERINEURAL at 03:53

## 2021-06-08 RX ADMIN — OXYCODONE 5 MG: 5 TABLET ORAL at 21:56

## 2021-06-08 RX ADMIN — Medication 12 ML/HR: at 04:06

## 2021-06-08 RX ADMIN — OXYTOCIN 10000 MILLI-UNITS: 10 INJECTION INTRAVENOUS at 05:27

## 2021-06-08 RX ADMIN — OXYTOCIN 87.3 MILLI-UNITS/MIN: 10 INJECTION INTRAVENOUS at 05:39

## 2021-06-08 RX ADMIN — Medication 10 MG: at 04:20

## 2021-06-08 RX ADMIN — IBUPROFEN 800 MG: 400 TABLET, FILM COATED ORAL at 11:50

## 2021-06-08 RX ADMIN — BUPIVACAINE HYDROCHLORIDE AND EPINEPHRINE 5 ML: 2.5; 5 INJECTION, SOLUTION EPIDURAL; INFILTRATION; INTRACAUDAL; PERINEURAL at 03:51

## 2021-06-08 RX ADMIN — Medication 10 ML: at 02:20

## 2021-06-08 RX ADMIN — OXYCODONE 5 MG: 5 TABLET ORAL at 17:54

## 2021-06-08 RX ADMIN — Medication 1 TABLET: at 11:50

## 2021-06-08 RX ADMIN — SODIUM CHLORIDE, POTASSIUM CHLORIDE, SODIUM LACTATE AND CALCIUM CHLORIDE 125 ML/HR: 600; 310; 30; 20 INJECTION, SOLUTION INTRAVENOUS at 03:29

## 2021-06-08 RX ADMIN — BUTORPHANOL TARTRATE 2 MG: 2 INJECTION, SOLUTION INTRAMUSCULAR; INTRAVENOUS at 02:19

## 2021-06-08 NOTE — L&D DELIVERY NOTE
Delivery Summary    Patient: Tonia Brumfield MRN: [id number]  SSN: [id number]    YOB: 1990  Age: 28 y.o. Sex: female       28 y.o.  at 38w1d  Induced for PIH found to be Covid-19 positive by rapid testing in labor. Cervidil placed with onset uterine contractions. SROM clear fluid. Epidural placed.  from OA position over intact perineum. Nuchal cord x 1. Placenta spontaneous and intact. No lacerations. EBL 150cc. Information for the patient's :  Romero Velazquez [[id number]]       Labor Events:    Labor: No    Steroids: None   Cervical Ripening Date/Time: 2021 5:58 PM   Cervical Ripening Type: Cervidil   Antibiotics During Labor: No   Rupture Identifier: Sac 1    Rupture Date/Time: 2021 3:03 AM   Rupture Type: SROM   Amniotic Fluid Volume:  Moderate    Amniotic Fluid Description: Clear    Amniotic Fluid Odor: None    Induction: Prostaglandins       Induction Date/Time: 2021 5:58 PM    Indications for Induction: Gestational Hypertension    Augmentation: None   Augmentation Date/Time:      Indications for Augmentation:     Labor complications: None       Additional complications:        Delivery Events:  Indications For Episiotomy:     Episiotomy: None   Perineal Laceration(s): None   Repaired:     Periurethral Laceration Location:      Repaired:     Labial Laceration Location:     Repaired:     Sulcal Laceration Location:     Repaired:     Vaginal Laceration Location:     Repaired:     Cervical Laceration Location:     Repaired:     Repair Suture: None   Number of Repair Packets:     Estimated Blood Loss (ml):  ml   Quantitative Blood Loss (ml)                Delivery Date: 2021    Delivery Time: 5:23 AM  Delivery Type: Vaginal, Spontaneous  Sex:  Female    Gestational Age: 38w1d   Delivery Clinician:  Ofelia Joshua  Living Status: Living   Delivery Location: L&D Room 3168          APGARS  One minute Five minutes Ten minutes   Skin color: 0 1        Heart rate: 2   2        Grimace: 2   2        Muscle tone: 2   2        Breathin   2        Totals: 8   9            Presentation: Vertex    Position:   Occiput Anterior  Resuscitation Method:  Suctioning-bulb; Tactile Stimulation     Meconium Stained: None      Cord Information: 3 Vessels  Complications: Nuchal Cord With Compressions  Cord around: head  Delayed cord clamping? Yes  Cord clamped date/time:2021  5:24 AM  Disposition of Cord Blood: Lab    Blood Gases Sent?: No    Placenta:  Date/Time: 2021  5:26 AM  Removal: Expressed      Appearance: Intact     [place] Measurements:  Birth Weight:        Birth Length:        Head Circumference:        Chest Circumference:       Abdominal Girth:       Other Providers:   ISSA Saavedra;SUSANA LINDSAY;Moraima GARZA, Obstetrician;Primary Nurse;Primary [place] Nurse;Staff Nurse;Charge Nurse           Group B Strep:   Lab Results   Component Value Date/Time    Elizabeth External Negative 2021 12:00 AM

## 2021-06-08 NOTE — PROGRESS NOTES
10:20 PM  Spoke with Dr Millie Johnson and explained to him that pt was admitted under the incorrect pt at 1729, pt identifiers not incorrect. Error found around 2130 tonight. Dr Millie Johnson will place orders for this correct pt.     12:00 AM  Received result from Covid swab. Pt positive for Covid. Will place pt on droplet plus precautions.

## 2021-06-08 NOTE — PROGRESS NOTES
LABOR CHECK      Name: Jake Hernandez MRN: [id number]  SSN: [id number]    YOB: 1990  Age: 28 y.o. Sex: female      Jake Hernandez 28 y.o.   at 38w1d  admitted for cervical ripening and induction of labor    Current Facility-Administered Medications   Medication Dose Route Frequency    lactated Ringers infusion  125 mL/hr IntraVENous CONTINUOUS    oxytocin (PITOCIN) 30 units/500 ml LR  0-20 malcolm-units/min IntraVENous TITRATE    oxytocin (PITOCIN) 10 unit bolus from bag  10 Units IntraVENous PRN    And    oxytocin (PITOCIN) 30 units/500 ml LR  87.3 malcolm-units/min IntraVENous PRN    lactated ringers bolus infusion 1,000 mL  1,000 mL IntraVENous ONCE    sodium chloride (NS) flush 5-40 mL  5-40 mL IntraVENous Q8H    sodium chloride (NS) flush 5-40 mL  5-40 mL IntraVENous PRN    fentaNYL 2mcg/mL - bupivacaine 0.125% pf epidural  1-16 mL/hr Epidural CONTINUOUS    lactated ringers bolus infusion 500 mL  500 mL IntraVENous PRN    ondansetron (ZOFRAN) injection 4 mg  4 mg IntraVENous Q4H PRN    acetaminophen (TYLENOL) tablet 650 mg  650 mg Oral Q4H PRN    oxyCODONE IR (ROXICODONE) tablet 5 mg  5 mg Oral Q4H PRN    butorphanol (STADOL) injection 2 mg  2 mg IntraVENous Q3H PRN    naloxone (NARCAN) injection 0.4 mg  0.4 mg IntraVENous PRN    lidocaine (PF) (XYLOCAINE) 10 mg/mL (1 %) injection 10 mL  10 mL IntraDERMal ONCE    sodium chloride (NS) flush 5-40 mL  5-40 mL IntraVENous Q8H    sodium chloride (NS) flush 5-40 mL  5-40 mL IntraVENous PRN     Facility-Administered Medications Ordered in Other Encounters   Medication Dose Route Frequency    bupivacaine 0.25% -EPINEPHrine 1:200,000 (SENSORCAINE) 0.25 %-1:200,000 injection   Epidural PRN         No Known Allergies      Vitals:  Visit Vitals  /60   Pulse (!) 130   Temp 97.9 °F (36.6 °C)   Resp 16   Ht 5' 10\" (1.778 m)   Wt 97.1 kg (214 lb)   SpO2 100%   BMI 30.71 kg/m²     Temp (24hrs), Av °F (36.7 °C), Min:97.7 °F (36.5 °C), Max:98.3 °F (36.8 °C)              Cervix 10   Effacement 100%   Station   -1   Cx Position Anterior   Position ROP   Membranes ROM, Clear fluid   FHR Cat 2   [place]  q 3 Minutes   Pitocin No       Pain relief: Epidural    Assessment: 28 y.o.  at 38w1d  Active phase labor.                           Reassuring fetal  assessment                            Covid-19 positive      Plan: Continue current management plan

## 2021-06-08 NOTE — PROGRESS NOTES
Chart reviewed for completion of orders. This patient is not the correct patient that physically arrived.

## 2021-06-08 NOTE — ROUTINE PROCESS
: Bedside shift change report given to JAIRON Krueger, [facility] Son. Boo Ace RN (oncoming nurse) by AL Arthur RN (offgoing nurse). Report included the following information SBAR, Kardex and MAR.  
 
: After drawing ordered patient labs, RN found that patient profile that was arrived upon admission does not match current patient. Name and only partial birthday matched. Additional patient identifiers utilized, MRN found not to belong to patient. Charge nurse, Nursing supervisor, L&D nurse manager and RN preceptor was notified. Correct patient profile located and arrived. : Dr. Yonas Orlando notified regarding incorrect patient admittance. MD advised RN that new orders for correct patient will be placed. 0000: Lab notified RN regarding COVID rapid result. Patient tested positive for COVID. Patient placed on droplet precautions per protocol.  
 
0303: SROM clear fluid noted, cervidil pulled at 0305 
 
0343: Anesthesia at bedside. Patient up to sitting position fetal heart rate not tracing due to maternal position. 0345: Patient history and allergies reviewed. Time out done. 3531: Epidural procedure complete, patient to lying position, fetal heart rate now tracing. 
 
0405: SVE by CAM Ace RN 7 
 
7258: Early decelerations noted on strip, patient placed on right lateral side and placed in trendelenburg. 
 
0422: O2 placed on patient, ephedrine given for low BP 
 
0431: Patient placed on left lateral side 5860: SVE by Dr Yonas Orlando, dilation complete 10/100/0 
 
0523:  of live baby girl 0740: TRANSFER - OUT REPORT: 
 
Verbal report given to Geetha Swanson RN (name) on [facility]  being transferred to Mother Infant (unit) for routine progression of care Report consisted of patients Situation, Background, Assessment and  
Recommendations(SBAR). Information from the following report(s) SBAR, Kardex and MAR was reviewed with the receiving nurse.  
 
Lines:  
Peripheral IV 21 Left;Posterior Hand (Active) Site Assessment Clean, dry, & intact 06/08/21 0349 Phlebitis Assessment 0 06/08/21 0349 Infiltration Assessment 0 06/08/21 0349 Dressing Status Clean, dry, & intact 06/08/21 0349 Dressing Type Transparent;Tape 06/08/21 0349 Hub Color/Line Status Pink 06/08/21 0349 Opportunity for questions and clarification was provided. Patient transported with: 
 Registered Nurse

## 2021-06-08 NOTE — ANESTHESIA PROCEDURE NOTES
Epidural Block    Patient location during procedure: OB  Reason for block: labor epidural  Staffing  Performed: attending   Preanesthetic Checklist  Completed: patient identified, IV checked, site marked, risks and benefits discussed, surgical consent, monitors and equipment checked, pre-op evaluation and timeout performed  Block Placement  Patient position: sitting  Prep: DuraPrep  Sterility prep: mask, hand, gloves, drape and cap  Sedation level: no sedation  Patient monitoring: heart rate and continuous pulse oximetry  Approach: midline  Location: lumbar  Epidural  Loss of resistance technique: air  Guidance: landmark technique  Needle  Needle type: [employer]   Needle gauge: 17 G  Needle length: 9 cm  Catheter at skin depth: 12 cm  Catheter securement method: clear occlusive dressing and surgical tape  Test dose: negative  Assessment  Block outcome: pain improved  Number of attempts: 1  Procedure assessment: patient tolerated procedure well with no immediate complications

## 2021-06-08 NOTE — ANESTHESIA PREPROCEDURE EVALUATION
Relevant Problems   No relevant active problems       Anesthetic History   No history of anesthetic complications            Review of Systems / Medical History  Patient summary reviewed, nursing notes reviewed and pertinent labs reviewed    Pulmonary  Within defined limits                 Neuro/Psych   Within defined limits           Cardiovascular    Hypertension              Exercise tolerance: >4 METS     GI/Hepatic/Renal  Within defined limits              Endo/Other  Within defined limits           Other Findings   Comments: covid +           Physical Exam    Airway  Mallampati: II  TM Distance: 4 - 6 cm  Neck ROM: normal range of motion   Mouth opening: Normal     Cardiovascular  Regular rate and rhythm,  S1 and S2 normal,  no murmur, click, rub, or gallop             Dental  No notable dental hx       Pulmonary  Breath sounds clear to auscultation               Abdominal  GI exam deferred       Other Findings            Anesthetic Plan    ASA: 2  Anesthesia type: epidural          Induction: Intravenous  Anesthetic plan and risks discussed with: Patient

## 2021-06-08 NOTE — PROGRESS NOTES
3:20 AM  Called and updated Dr Bandar Orlando on pt. Pt with SROM at 0303, Cervidil pulled at 0305. Will proceed with getting pt an epidural and continue with expectant plan of care.

## 2021-06-08 NOTE — ROUTINE PROCESS
2130: After drawing ordered patient labs, RN found that patient profile that was arrived upon admission does not match current patient. Name and only partial birthday matched. Additional patient identifiers utilized, MRN found not to belong to patient. Charge nurse, Nursing supervisor, L&D nurse manager and RN preceptor was notified. Correct patient profile located and arrived. All previous charting by current RN is erroneous and has been re-charted correctly under correct patient MRN.

## 2021-06-08 NOTE — PROGRESS NOTES
LABOR CHECK      Name: Leanne Koch MRN: [id number]  SSN: [id number]    YOB: 1990  Age: 28 y.o. Sex: female      Leanne Koch 28 y.o.   at 38w1d  admitted for cervical ripening and induction of labor    Current Facility-Administered Medications   Medication Dose Route Frequency    lactated Ringers infusion  125 mL/hr IntraVENous CONTINUOUS    oxytocin (PITOCIN) 30 units/500 ml LR  0-20 malcolm-units/min IntraVENous TITRATE    oxytocin (PITOCIN) 10 unit bolus from bag  10 Units IntraVENous PRN    And    oxytocin (PITOCIN) 30 units/500 ml LR  87.3 malcolm-units/min IntraVENous PRN    lactated ringers bolus infusion 1,000 mL  1,000 mL IntraVENous ONCE    sodium chloride (NS) flush 5-40 mL  5-40 mL IntraVENous Q8H    sodium chloride (NS) flush 5-40 mL  5-40 mL IntraVENous PRN    fentaNYL 2mcg/mL - bupivacaine 0.125% pf epidural  1-16 mL/hr Epidural CONTINUOUS    ePHEDrine in NS (PF) (MISTOLE) 10 mg/mL in NS syringe 10 mg  10 mg IntraVENous ONCE PRN    lactated ringers bolus infusion 500 mL  500 mL IntraVENous PRN    ondansetron (ZOFRAN) injection 4 mg  4 mg IntraVENous Q4H PRN    acetaminophen (TYLENOL) tablet 650 mg  650 mg Oral Q4H PRN    oxyCODONE IR (ROXICODONE) tablet 5 mg  5 mg Oral Q4H PRN    butorphanol (STADOL) injection 2 mg  2 mg IntraVENous Q3H PRN    naloxone (NARCAN) injection 0.4 mg  0.4 mg IntraVENous PRN    lidocaine (PF) (XYLOCAINE) 10 mg/mL (1 %) injection 10 mL  10 mL IntraDERMal ONCE    sodium chloride (NS) flush 5-40 mL  5-40 mL IntraVENous Q8H    sodium chloride (NS) flush 5-40 mL  5-40 mL IntraVENous PRN     Facility-Administered Medications Ordered in Other Encounters   Medication Dose Route Frequency    bupivacaine 0.25% -EPINEPHrine 1:200,000 (SENSORCAINE) 0.25 %-1:200,000 injection   Epidural PRN         No Known Allergies      Vitals:  Visit Vitals  BP (!) 119/58   Pulse (!) 105   Temp 98.3 °F (36.8 °C)   Resp 16   Ht 5' 10\" (1.778 m)   Wt 97.1 kg (214 lb)   SpO2 95%   BMI 30.71 kg/m²     Temp (24hrs), Av °F (36.7 °C), Min:97.7 °F (36.5 °C), Max:98.3 °F (36.8 °C)      Cervix 7   Membranes ROM, Clear fluid   FHR Cat 2   [place]  q 3 Minutes   Pitocin No       Pain relief: Epidural    Assessment: 28 y.o.  at 38w1d  Active phase labor.                          Covid positive rapid testing, asymptomatic                          Reassuring fetal  assessment, variables noted                          Plan: Continue current management plan

## 2021-06-08 NOTE — ROUTINE PROCESS
1900 Bedside shift change report given to JOVANNY Jackson RN (oncoming nurse) by Leora Farias [facility] (offgoing nurse). Report included the following information SBAR, Kardex, Intake/Output and MAR.

## 2021-06-09 VITALS
SYSTOLIC BLOOD PRESSURE: 123 MMHG | BODY MASS INDEX: 30.64 KG/M2 | TEMPERATURE: 97.8 F | RESPIRATION RATE: 16 BRPM | HEIGHT: 70 IN | HEART RATE: 71 BPM | WEIGHT: 214 LBS | DIASTOLIC BLOOD PRESSURE: 73 MMHG | OXYGEN SATURATION: 99 %

## 2021-06-09 PROCEDURE — 74011250637 HC RX REV CODE- 250/637: Performed by: OBSTETRICS & GYNECOLOGY

## 2021-06-09 RX ADMIN — OXYCODONE 5 MG: 5 TABLET ORAL at 16:39

## 2021-06-09 RX ADMIN — IBUPROFEN 800 MG: 400 TABLET, FILM COATED ORAL at 16:39

## 2021-06-09 RX ADMIN — IBUPROFEN 800 MG: 400 TABLET, FILM COATED ORAL at 08:36

## 2021-06-09 RX ADMIN — OXYCODONE 5 MG: 5 TABLET ORAL at 01:53

## 2021-06-09 RX ADMIN — Medication 1 TABLET: at 08:36

## 2021-06-09 NOTE — PROGRESS NOTES
Post-Partum Day Number 1 Progress Note    [place]     Information for the patient's :  Lana Garland [[id number]]   Vaginal, Spontaneous    Patient doing well without significant complaint. Voiding without difficulty, normal lochia. Vitals:    Visit Vitals  BP (!) 103/56 (BP 1 Location: Left upper arm, BP Patient Position: At rest)   Pulse 74   Temp 98.5 °F (36.9 °C)   Resp 17   Ht 5' 10\" (1.778 m)   Wt 97.1 kg (214 lb)   SpO2 99%   Breastfeeding Unknown   BMI 30.71 kg/m²     Temp (24hrs), Av.9 °F (36.6 °C), Min:97.5 °F (36.4 °C), Max:98.5 °F (36.9 °C)          Exam:  Patient without distress. Abdomen soft, fundus firm, nontender                Perineum with normal lochia noted. Lower extremities are negative for swelling, cords or tenderness. Labs:     Lab Results   Component Value Date/Time    WBC 16.0 (H) 2021 11:04 PM    WBC 9.2 2020 11:01 PM    WBC 10.4 2019 11:00 AM    WBC 11.0 2009 12:15 PM    HGB 12.2 2021 11:04 PM    HGB 13.1 2020 11:01 PM    HGB 11.7 2019 11:00 AM    HGB 12.7 2009 12:15 PM    HCT 37.4 2021 11:04 PM    HCT 42.2 2020 11:01 PM    HCT 36.2 2019 11:00 AM    HCT 37.6 2009 12:15 PM    PLATELET 605  11:04 PM    PLATELET 781  11:01 PM    PLATELET 312  11:00 AM    PLATELET 859  12:15 PM       No results found for this or any previous visit (from the past 24 hour(s)). Assessment: Doing well, post partum day 1      Plan:  1. Continue routine postpartum and perineal care as well as maternal education.

## 2021-06-10 NOTE — ADT AUTH CERT NOTES
Hussein Simpson, After speaking with our [facility] nurse on this case, she has indicated that they will not be making any clinical available for this case. So, I assume the request for authorization should be voided. Apologies for any inconvenience. Thanks, Chani Rodriguez Tel: [phone] Fax: [phone] These lines are both confidential

## 2021-06-10 NOTE — H&P
History & Physical    Name: Dann Burger MRN: [id number]  SSN: [id number]    YOB: 1990  Age: 28 y.o. Sex: female      Subjective:     Estimated Date of Delivery: 21  OB History    Para Term  AB Living   5 4 4   1 3   SAB TAB Ectopic Molar Multiple Live Births   1       0 3      # Outcome Date GA Lbr [place]/2nd Weight Sex Delivery Anes PTL Lv   5 Term 21 38w1d 02:38 / 00:26 2.97 kg F Vag-Spont EPI N YESSICA   4 SAB 10/28/20           3 Term 19 37w6d 11:15 / 00:18 3.005 kg M Vag-Spont CSE N YESSICA   2 Term 13    M       1 Term 09    [place] Decent       Ms. Jeanie Lockett is admitted with pregnancy at 37w4d for induction of labor due to favorable cervix at term and hypertension. Prenatal course was complicated by elevated blood pressure in physician's office . Please see prenatal records for details. Past Medical History:   Diagnosis Date    Abnormal Papanicolaou smear of cervix     states over 5 years ago, all clean since    Anxiety     Gestational hypertension      Past Surgical History:   Procedure Laterality Date    COLONOSCOPY N/A 2018    COLONOSCOPY performed by Berna Farris MD at [facility] Not on file   Tobacco Use    Smoking status: Former Smoker     Types: Cigarettes    Smokeless tobacco: Never Used    Tobacco comment: Socially smokes   Vaping Use    Vaping Use: Former   Substance and Sexual Activity    Alcohol use: Not Currently     Comment: very rare    Drug use: No    Sexual activity: Not on file     History reviewed. No pertinent family history. No Known Allergies  Prior to Admission medications    Medication Sig Start Date End Date Taking? Authorizing Provider   ondansetron hcl (Zofran) 4 mg tablet Take 4 mg by mouth every eight (8) hours as needed for Nausea or Vomiting.    Yes Provider, Historical   promethazine (PHENERGAN) 25 mg tablet Take 25 mg by mouth every six (6) hours as needed for Nausea. Yes Provider, Historical   PNV No12-Iron-FA-DSS-OM-3 29 mg iron-1 mg -50 mg CPKD Take 1 Tab by mouth. Yes Provider, Historical   naproxen (NAPROSYN) 500 mg tablet Take 1 Tab by mouth two (2) times daily (with meals). Patient not taking: Reported on 6/7/2021 6/30/20   Bo Iraheta MD   ibuprofen (MOTRIN) 800 mg tablet Take 1 Tab by mouth every eight (8) hours as needed for Pain. Patient not taking: Reported on 6/7/2021 12/5/19   Roseann Matute MD   calcium carbonate (TUMS) 200 mg calcium (500 mg) chew Take 2 Tabs by mouth three (3) times daily. Patient not taking: Reported on 6/7/2021    Provider, Historical   acetaminophen (TYLENOL EXTRA STRENGTH) 500 mg tablet Take 500 mg by mouth two (2) times a day. Patient not taking: Reported on 6/7/2021    Provider, Historical   dextroamphetamine-amphetamine (ADDERALL) 10 mg tablet Take 10 mg by mouth two (2) times a day. Patient not taking: Reported on 6/7/2021    Provider, Historical   clonazePAM (KLONOPIN) 0.5 mg tablet Take 0.5 mg by mouth nightly as needed. Patient not taking: Reported on 6/7/2021    Provider, Historical   ALPRAZolam Mcduffie Bridges) 0.5 mg tablet Take 0.5 mg by mouth daily. Patient not taking: Reported on 6/7/2021    Provider, Historical   dextroamphetamine-amphetamine (ADDERALL) 10 mg tablet Take 10 mg by mouth two (2) times a day. Patient not taking: Reported on 6/7/2021    Provider, Historical   ALPRAZolam Mcduffie Bridges) 0.5 mg tablet Take 0.5 mg by mouth daily as needed for Anxiety. Patient not taking: Reported on 6/7/2021    Provider, Historical   clonazePAM (KLONOPIN) 0.5 mg tablet Take 0.5 mg by mouth two (2) times daily as needed. Patient not taking: Reported on 6/7/2021    Provider, Historical   acetaminophen (TYLENOL EXTRA STRENGTH) 500 mg tablet Take 1,000 mg by mouth two (2) times daily as needed for Pain.   Patient not taking: Reported on 6/7/2021    Provider, Historical        Review of Systems    Objective: Vitals:  Vitals:    06/08/21 1959 06/09/21 0149 06/09/21 0807 06/09/21 1424   BP: 128/73 (!) 106/58 (!) 103/56 123/73   Pulse: 82 88 74 71   Resp: 18 18 17 16   Temp: 97.5 °F (36.4 °C) 97.7 °F (36.5 °C) 98.5 °F (36.9 °C) 97.8 °F (36.6 °C)   SpO2:       Weight:       Height:            Physical Exam      Cervical Exam: 0 cm dilated    50% effaced    -2 station    Presenting Part: cephalic  Cervical Position: posterior  Consistency: Soft  Membranes: Intact  Fetal Heart Rate: Reactive     Prenatal Labs:    Lab Results   Component Value Date/Time    ABO/Rh(D) O POSITIVE 06/07/2021 11:04 PM    Rubella, External 1.03 - Immune 12/05/2020 12:00 AM    GrBStrep, External Negative 05/26/2021 12:00 AM    HBsAg, External Negative 12/05/2020 12:00 AM    HIV, External Non Reactive 12/05/2020 12:00 AM    RPR, External Non Reactive 12/05/2020 12:00 AM    Gonorrhea, External neg 05/21/2019 12:00 AM    Chlamydia, External neg 05/21/2019 12:00 AM    ABO,Rh O Pos 05/21/2019 12:00 AM          Impression/Plan:     Active Problems:    PIH (pregnancy induced hypertension) (6/7/2021)      COVID-19 affecting pregnancy, antepartum (6/8/2021)         Plan: Admit for induction of labor. Group B Strep negative. Cervidil placed, plan to remove in 12 hours pending fetal and maternal tolerance. Will start pitocin at 0600. Plan discussed with patient and her .

## 2021-06-15 NOTE — DISCHARGE SUMMARY
Obstetrical Discharge Summary     Name: Analilia Gunderson MRN: [id number]  SSN: [id number]    YOB: 1990  Age: 28 y.o. Sex: female      Allergies: Patient has no known allergies. Admit Date: 2021    Discharge Date: 6/15/2021     Admitting Physician: Tawana Leyden, MD     Attending Physician:  No att. providers found     * Admission Diagnoses: PIH (pregnancy induced hypertension) [O13.9]    * Discharge Diagnoses:   Information for the patient's :  Karen More [[id number]]   Delivery of a 2.97 kg female infant via Vaginal, Spontaneous on 2021 at 5:23 AM  by Tawana Leyden. Apgars were 8  and 9 . Additional Diagnoses:   Hospital Problems as of 2021 Date Reviewed: 2021        Codes Class Noted - Resolved POA    COVID-19 affecting pregnancy, antepartum ICD-10-CM: O98.519, U07.1  ICD-9-CM: 647.63, 079.89  2021 - Present Yes        PIH (pregnancy induced hypertension) ICD-10-CM: O13.9  ICD-9-CM: 642.30  2021 - Present Yes             Lab Results   Component Value Date/Time    ABO/Rh(D) O POSITIVE 2021 11:04 PM    Rubella, External 1.03 - Immune 2020 12:00 AM    GrBStrep, External Negative 2021 12:00 AM    ABO,Rh O pos 2020 12:00 AM      Immunization History   Administered Date(s) Administered    Influenza Vaccine [employer]) PF (>6 Mo Flulaval, Fluarix, and >3 Yrs Afluria, Fluzone [id number]) 2019    Tdap 2019       * Procedures:          * Discharge Condition: stable    * Hospital Course: Normal hospital course following the delivery.     * Disposition: Home    Discharge Medications:   Discharge Medication List as of 2021  5:29 PM      CONTINUE these medications which have NOT CHANGED    Details   ondansetron hcl (Zofran) 4 mg tablet Take 4 mg by mouth every eight (8) hours as needed for Nausea or Vomiting., Historical Med      promethazine (PHENERGAN) 25 mg tablet Take 25 mg by mouth every six (6) hours as needed for Nausea., Historical Med      PNV No12-Iron-FA-DSS-OM-3 29 mg iron-1 mg -50 mg CPKD Take 1 Tab by mouth., Historical Med      naproxen (NAPROSYN) 500 mg tablet Take 1 Tab by mouth two (2) times daily (with meals). , Print, Disp-20 Tab, R-0      ibuprofen (MOTRIN) 800 mg tablet Take 1 Tab by mouth every eight (8) hours as needed for Pain., Print, Disp-20 Tab, R-0      calcium carbonate (TUMS) 200 mg calcium (500 mg) chew Take 2 Tabs by mouth three (3) times daily. , Historical Med      !! acetaminophen (TYLENOL EXTRA STRENGTH) 500 mg tablet Take 500 mg by mouth two (2) times a day., Historical Med      !! dextroamphetamine-amphetamine (ADDERALL) 10 mg tablet Take 10 mg by mouth two (2) times a day., Historical Med      !! clonazePAM (KLONOPIN) 0.5 mg tablet Take 0.5 mg by mouth nightly as needed., Historical Med      !! ALPRAZolam (XANAX) 0.5 mg tablet Take 0.5 mg by mouth daily. , Historical Med      !! dextroamphetamine-amphetamine (ADDERALL) 10 mg tablet Take 10 mg by mouth two (2) times a day., Historical Med      !! ALPRAZolam (XANAX) 0.5 mg tablet Take 0.5 mg by mouth daily as needed for Anxiety. , Historical Med      !! clonazePAM (KLONOPIN) 0.5 mg tablet Take 0.5 mg by mouth two (2) times daily as needed., Historical Med      !! acetaminophen (TYLENOL EXTRA STRENGTH) 500 mg tablet Take 1,000 mg by mouth two (2) times daily as needed for Pain., Historical Med       !! - Potential duplicate medications found. Please discuss with provider. * Follow-up Care/Patient Instructions:   Activity: Activity as tolerated, No lifting, Driving, or Strenuous exercise for 6 and No sex for 6 weeks  Diet: Regular Diet  Wound Care: Keep wound clean and dry    Follow-up Information     Follow up With Specialties Details Why Contact Info    Suyapa Gerard, [facility] [id number] [phone]      Imelda Freitas MD Obstetrics & Gynecology, Gynecology, Obstetrics In 4 weeks [address]  [address]  [phone]

## 2022-04-26 NOTE — H&P
Colonoscopy History and Physical 
 
 
The patient was seen and examined. Date of last colonoscopy: none, Polyps  No   
 
The airway was assessed and documented. The problem list, past medical history, and medications were reviewed. There is no problem list on file for this patient. Social History Socioeconomic History  Marital status:  Spouse name: Not on file  Number of children: Not on file  Years of education: Not on file  Highest education level: Not on file Social Needs  Financial resource strain: Not on file  Food insecurity - worry: Not on file  Food insecurity - inability: Not on file  Transportation needs - medical: Not on file  Transportation needs - non-medical: Not on file Occupational History  Not on file Tobacco Use  Smoking status: Current Some Day Smoker  Smokeless tobacco: Never Used  Tobacco comment: Socially smokes Substance and Sexual Activity  Alcohol use: Yes Comment: very rare  Drug use: No  
 Sexual activity: Not on file Other Topics Concern  Not on file Social History Narrative  Not on file Past Medical History:  
Diagnosis Date  Anxiety Prior to Admission Medications Prescriptions Last Dose Informant Patient Reported? Taking? ALPRAZolam (XANAX) 0.5 mg tablet 11/24/2018  Yes Yes Sig: Take 0.5 mg by mouth daily. acetaminophen (TYLENOL EXTRA STRENGTH) 500 mg tablet 11/24/2018  Yes Yes Sig: Take 500 mg by mouth two (2) times a day. clonazePAM (KLONOPIN) 0.5 mg tablet 11/13/2018  Yes Yes Sig: Take 0.5 mg by mouth nightly as needed. dextroamphetamine-amphetamine (ADDERALL) 10 mg tablet 11/26/2018 at Unknown time  Yes Yes Sig: Take 10 mg by mouth two (2) times a day. ibuprofen 200 mg cap 11/20/2018 at Unknown time  Yes Yes Sig: Take 600 mg by mouth two (2) times a day. Facility-Administered Medications: None The patient was seen and examined in the endoscopy suite. The airway was assessed and docuemented. The problem list and medications were reviewed. Chief complaint, history of present illness, and review of systems and Past medical History are positive for: Abdominal pain and blood in stools The heart, lungs and mental status were satisfactory for the administration of sedation and for the procedure. I discussed with the patient the objectives, risks, consequences and alternatives to the procedure. Plan: Endoscopic procedure with sedation Yue Arnett MD  
11/27/2018 10:11 AM 
 No

## (undated) DEVICE — QUILTED PREMIUM COMFORT UNDERPAD,EXTRA HEAVY: Brand: WINGS

## (undated) DEVICE — Z DISCONTINUED NO SUB IDED SET EXTN W/ 4 W STPCOCK M SPIN LOK 36IN

## (undated) DEVICE — BAG BELONG PT PERS CLEAR HANDL

## (undated) DEVICE — CONNECTOR TBNG AUX H2O JET DISP FOR OLY 160/180 SER

## (undated) DEVICE — 1200 GUARD II KIT W/5MM TUBE W/O VAC TUBE: Brand: GUARDIAN

## (undated) DEVICE — Device

## (undated) DEVICE — FORCEPS BX L240CM JAW DIA2.8MM L CAP W/ NDL MIC MESH TOOTH

## (undated) DEVICE — CANN NASAL O2 CAPNOGRAPHY AD -- FILTERLINE

## (undated) DEVICE — SET ADMIN 16ML TBNG L100IN 2 Y INJ SITE IV PIGGY BK DISP

## (undated) DEVICE — KENDALL RADIOLUCENT FOAM MONITORING ELECTRODE -RECTANGULAR SHAPE: Brand: KENDALL

## (undated) DEVICE — ENDO CARRY-ON PROCEDURE KIT INCLUDES ENZYMATIC SPONGE, GAUZE, BIOHAZARD LABEL, TRAY, LUBRICANT, DIRTY SCOPE LABEL, WATER LABEL, TRAY, DRAWSTRING PAD, AND DEFENDO 4-PIECE KIT.: Brand: ENDO CARRY-ON PROCEDURE KIT

## (undated) DEVICE — CONTAINER SPEC 20 ML LID NEUT BUFF FORMALIN 10 % POLYPR STS

## (undated) DEVICE — CATH IV AUTOGRD BC BLU 22GA 25 -- INSYTE

## (undated) DEVICE — KIT IV STRT W CHLORAPREP PD 1ML

## (undated) DEVICE — Z DISCONTINUED USE 2751540 TUBING IRRIG L10IN DISP PMP ENDOGATOR

## (undated) DEVICE — NEONATAL-ADULT SPO2 SENSOR: Brand: NELLCOR

## (undated) DEVICE — Device: Brand: MEDICAL ACTION INDUSTRIES

## (undated) DEVICE — NEEDLE HYPO 18GA L1.5IN PNK S STL HUB POLYPR SHLD REG BVL

## (undated) DEVICE — BW-412T DISP COMBO CLEANING BRUSH: Brand: SINGLE USE COMBINATION CLEANING BRUSH

## (undated) DEVICE — BAG SPEC BIOHZD LF 2MIL 6X10IN -- CONVERT TO ITEM 357326

## (undated) DEVICE — AIRLIFE™ U/CONNECT-IT OXYGEN TUBING 7 FEET (2.1 M) CRUSH-RESISTANT OXYGEN TUBING, VINYL TIPPED: Brand: AIRLIFE™

## (undated) DEVICE — SYRINGE MED 20ML STD CLR PLAS LUERLOCK TIP N CTRL DISP

## (undated) DEVICE — SOLIDIFIER FLUID 3000 CC ABSORB